# Patient Record
Sex: MALE | Race: WHITE | NOT HISPANIC OR LATINO | Employment: UNEMPLOYED | URBAN - METROPOLITAN AREA
[De-identification: names, ages, dates, MRNs, and addresses within clinical notes are randomized per-mention and may not be internally consistent; named-entity substitution may affect disease eponyms.]

---

## 2019-02-12 ENCOUNTER — OFFICE VISIT CONVERTED (OUTPATIENT)
Dept: UROLOGY | Facility: CLINIC | Age: 39
End: 2019-02-12
Attending: UROLOGY

## 2019-02-14 ENCOUNTER — HOSPITAL ENCOUNTER (OUTPATIENT)
Dept: CARDIOLOGY | Facility: HOSPITAL | Age: 39
Discharge: HOME | End: 2019-02-14

## 2019-02-17 ENCOUNTER — HOSPITAL ENCOUNTER (OUTPATIENT)
Dept: URGENT CARE | Facility: CLINIC | Age: 39
Discharge: HOME | End: 2019-02-17
Attending: FAMILY MEDICINE

## 2019-12-20 ENCOUNTER — OFFICE VISIT CONVERTED (OUTPATIENT)
Dept: ORTHOPEDIC SURGERY | Facility: CLINIC | Age: 39
End: 2019-12-20
Attending: ORTHOPAEDIC SURGERY

## 2019-12-20 ENCOUNTER — CONVERSION ENCOUNTER (OUTPATIENT)
Dept: ORTHOPEDIC SURGERY | Facility: CLINIC | Age: 39
End: 2019-12-20

## 2020-02-20 ENCOUNTER — OFFICE VISIT CONVERTED (OUTPATIENT)
Dept: ORTHOPEDIC SURGERY | Facility: CLINIC | Age: 40
End: 2020-02-20
Attending: PHYSICIAN ASSISTANT

## 2020-02-26 ENCOUNTER — HOSPITAL ENCOUNTER (OUTPATIENT)
Dept: MRI IMAGING | Facility: HOSPITAL | Age: 40
Discharge: HOME | End: 2020-02-26
Attending: PHYSICIAN ASSISTANT

## 2020-03-06 ENCOUNTER — OFFICE VISIT CONVERTED (OUTPATIENT)
Dept: ORTHOPEDIC SURGERY | Facility: CLINIC | Age: 40
End: 2020-03-06
Attending: PHYSICIAN ASSISTANT

## 2021-05-15 VITALS — BODY MASS INDEX: 31.99 KG/M2 | HEIGHT: 71 IN | OXYGEN SATURATION: 98 % | WEIGHT: 228.5 LBS | HEART RATE: 80 BPM

## 2021-05-15 VITALS — HEIGHT: 71 IN | WEIGHT: 237.12 LBS | BODY MASS INDEX: 33.2 KG/M2 | OXYGEN SATURATION: 97 % | HEART RATE: 103 BPM

## 2021-05-15 VITALS — BODY MASS INDEX: 32.94 KG/M2 | OXYGEN SATURATION: 99 % | HEIGHT: 71 IN | HEART RATE: 128 BPM | WEIGHT: 235.25 LBS

## 2021-05-16 VITALS
HEIGHT: 71 IN | BODY MASS INDEX: 27.77 KG/M2 | SYSTOLIC BLOOD PRESSURE: 140 MMHG | WEIGHT: 198.37 LBS | DIASTOLIC BLOOD PRESSURE: 88 MMHG

## 2022-09-03 ENCOUNTER — APPOINTMENT (OUTPATIENT)
Dept: GENERAL RADIOLOGY | Facility: HOSPITAL | Age: 42
End: 2022-09-03

## 2022-09-03 ENCOUNTER — HOSPITAL ENCOUNTER (EMERGENCY)
Facility: HOSPITAL | Age: 42
Discharge: HOME | End: 2022-09-03
Attending: EMERGENCY MEDICINE | Admitting: EMERGENCY MEDICINE

## 2022-09-03 VITALS
HEART RATE: 77 BPM | DIASTOLIC BLOOD PRESSURE: 76 MMHG | TEMPERATURE: 97.9 F | WEIGHT: 216.27 LBS | BODY MASS INDEX: 30.28 KG/M2 | SYSTOLIC BLOOD PRESSURE: 122 MMHG | HEIGHT: 71 IN | RESPIRATION RATE: 20 BRPM | OXYGEN SATURATION: 100 %

## 2022-09-03 DIAGNOSIS — R60.9 PERIPHERAL EDEMA: Primary | ICD-10-CM

## 2022-09-03 LAB
ALBUMIN SERPL-MCNC: 4 G/DL (ref 3.5–5.2)
ALBUMIN/GLOB SERPL: 1.6 G/DL
ALP SERPL-CCNC: 97 U/L (ref 39–117)
ALT SERPL W P-5'-P-CCNC: 16 U/L (ref 1–41)
ANION GAP SERPL CALCULATED.3IONS-SCNC: 8.9 MMOL/L (ref 5–15)
AST SERPL-CCNC: 17 U/L (ref 1–40)
BASOPHILS # BLD AUTO: 0.05 10*3/MM3 (ref 0–0.2)
BASOPHILS NFR BLD AUTO: 0.6 % (ref 0–1.5)
BILIRUB SERPL-MCNC: 0.4 MG/DL (ref 0–1.2)
BUN SERPL-MCNC: 15 MG/DL (ref 6–20)
BUN/CREAT SERPL: 17.4 (ref 7–25)
CALCIUM SPEC-SCNC: 9.1 MG/DL (ref 8.6–10.5)
CHLORIDE SERPL-SCNC: 102 MMOL/L (ref 98–107)
CO2 SERPL-SCNC: 29.1 MMOL/L (ref 22–29)
CREAT SERPL-MCNC: 0.86 MG/DL (ref 0.76–1.27)
DEPRECATED RDW RBC AUTO: 41.4 FL (ref 37–54)
EGFRCR SERPLBLD CKD-EPI 2021: 111.6 ML/MIN/1.73
EOSINOPHIL # BLD AUTO: 0.39 10*3/MM3 (ref 0–0.4)
EOSINOPHIL NFR BLD AUTO: 4.7 % (ref 0.3–6.2)
ERYTHROCYTE [DISTWIDTH] IN BLOOD BY AUTOMATED COUNT: 12.6 % (ref 12.3–15.4)
GLOBULIN UR ELPH-MCNC: 2.5 GM/DL
GLUCOSE SERPL-MCNC: 101 MG/DL (ref 65–99)
HCT VFR BLD AUTO: 37.6 % (ref 37.5–51)
HGB BLD-MCNC: 12.9 G/DL (ref 13–17.7)
HOLD SPECIMEN: NORMAL
HOLD SPECIMEN: NORMAL
IMM GRANULOCYTES # BLD AUTO: 0.04 10*3/MM3 (ref 0–0.05)
IMM GRANULOCYTES NFR BLD AUTO: 0.5 % (ref 0–0.5)
LYMPHOCYTES # BLD AUTO: 2.75 10*3/MM3 (ref 0.7–3.1)
LYMPHOCYTES NFR BLD AUTO: 32.9 % (ref 19.6–45.3)
MCH RBC QN AUTO: 30.7 PG (ref 26.6–33)
MCHC RBC AUTO-ENTMCNC: 34.3 G/DL (ref 31.5–35.7)
MCV RBC AUTO: 89.5 FL (ref 79–97)
MONOCYTES # BLD AUTO: 0.71 10*3/MM3 (ref 0.1–0.9)
MONOCYTES NFR BLD AUTO: 8.5 % (ref 5–12)
NEUTROPHILS NFR BLD AUTO: 4.42 10*3/MM3 (ref 1.7–7)
NEUTROPHILS NFR BLD AUTO: 52.8 % (ref 42.7–76)
NRBC BLD AUTO-RTO: 0 /100 WBC (ref 0–0.2)
NT-PROBNP SERPL-MCNC: 51.6 PG/ML (ref 0–450)
PLATELET # BLD AUTO: 270 10*3/MM3 (ref 140–450)
PMV BLD AUTO: 9.9 FL (ref 6–12)
POTASSIUM SERPL-SCNC: 4 MMOL/L (ref 3.5–5.2)
PROT SERPL-MCNC: 6.5 G/DL (ref 6–8.5)
RBC # BLD AUTO: 4.2 10*6/MM3 (ref 4.14–5.8)
SODIUM SERPL-SCNC: 140 MMOL/L (ref 136–145)
TROPONIN T SERPL-MCNC: <0.01 NG/ML (ref 0–0.03)
WBC NRBC COR # BLD: 8.36 10*3/MM3 (ref 3.4–10.8)
WHOLE BLOOD HOLD COAG: NORMAL
WHOLE BLOOD HOLD SPECIMEN: NORMAL

## 2022-09-03 PROCEDURE — 36415 COLL VENOUS BLD VENIPUNCTURE: CPT

## 2022-09-03 PROCEDURE — 93005 ELECTROCARDIOGRAM TRACING: CPT | Performed by: EMERGENCY MEDICINE

## 2022-09-03 PROCEDURE — 93005 ELECTROCARDIOGRAM TRACING: CPT

## 2022-09-03 PROCEDURE — 71045 X-RAY EXAM CHEST 1 VIEW: CPT

## 2022-09-03 PROCEDURE — 80053 COMPREHEN METABOLIC PANEL: CPT | Performed by: EMERGENCY MEDICINE

## 2022-09-03 PROCEDURE — 84484 ASSAY OF TROPONIN QUANT: CPT

## 2022-09-03 PROCEDURE — 99284 EMERGENCY DEPT VISIT MOD MDM: CPT

## 2022-09-03 PROCEDURE — 93010 ELECTROCARDIOGRAM REPORT: CPT | Performed by: INTERNAL MEDICINE

## 2022-09-03 PROCEDURE — 83880 ASSAY OF NATRIURETIC PEPTIDE: CPT

## 2022-09-03 PROCEDURE — 85025 COMPLETE CBC W/AUTO DIFF WBC: CPT

## 2022-09-03 RX ORDER — HYDROCHLOROTHIAZIDE 25 MG/1
25 TABLET ORAL DAILY
Qty: 30 TABLET | Refills: 0 | Status: SHIPPED | OUTPATIENT
Start: 2022-09-03

## 2022-09-03 RX ORDER — HYDROCHLOROTHIAZIDE 12.5 MG/1
12.5 CAPSULE, GELATIN COATED ORAL DAILY
Status: DISCONTINUED | OUTPATIENT
Start: 2022-09-03 | End: 2022-09-03 | Stop reason: HOSPADM

## 2022-09-03 RX ORDER — SODIUM CHLORIDE 0.9 % (FLUSH) 0.9 %
10 SYRINGE (ML) INJECTION AS NEEDED
Status: DISCONTINUED | OUTPATIENT
Start: 2022-09-03 | End: 2022-09-03 | Stop reason: HOSPADM

## 2022-09-03 RX ADMIN — HYDROCHLOROTHIAZIDE 12.5 MG: 12.5 CAPSULE ORAL at 16:01

## 2022-09-03 NOTE — ED PROVIDER NOTES
Subjective   Patient presents to the emergency department today reporting shortness of breath, dizziness, lightheadedness, bilateral leg swelling causing pain with walking, and a cough that started 2 and half weeks ago.  He says he started coughing up green stuff 3 days ago.  He also reports sharp low back pain and says he has a history of kidney stones.  He says all of this happened a couple of years ago to.  He is currently staying at [facility].          Review of Systems   Constitutional: Negative for chills and fever.   HENT: Negative for congestion, ear pain, rhinorrhea and sore throat.    Eyes: Negative for pain.   Respiratory: Positive for cough and shortness of breath.    Cardiovascular: Positive for leg swelling. Negative for chest pain.   Gastrointestinal: Negative for abdominal pain, diarrhea, nausea and vomiting.   Genitourinary: Negative for decreased urine volume, dysuria and flank pain.   Musculoskeletal: Positive for back pain (Sharp low back pain) and gait problem (Pain with walking). Negative for arthralgias and myalgias.   Skin: Negative for rash.   Neurological: Positive for dizziness and light-headedness. Negative for seizures and headaches.   All other systems reviewed and are negative.      Past Medical History:   Diagnosis Date   • Depression    • Hypertension        Allergies   Allergen Reactions   • Penicillins Rash and Swelling   • Sulfa Antibiotics Swelling   • Sulfamethoxazole-Trimethoprim Swelling   • Cefdinir Hives     Unknown reaction      • Cephalexin Rash     Unknown reaction      • Sulfacetamide-Sulfur In Urea Rash       Past Surgical History:   Procedure Laterality Date   • BACK SURGERY         History reviewed. No pertinent family history.    Social History     Socioeconomic History   • Marital status:    Tobacco Use   • Smoking status: Current Every Day Smoker     Packs/day: 1.00     Years: 20.00     Pack years: 20.00     Types: Cigarettes   • Smokeless  tobacco: Never Used   Vaping Use   • Vaping Use: Never used   Substance and Sexual Activity   • Alcohol use: Never   • Drug use: Never   • Sexual activity: Defer           Objective   Physical Exam  Vitals and nursing note reviewed.   Constitutional:       General: He is not in acute distress.     Appearance: Normal appearance. He is well-developed and normal weight. He is not ill-appearing, toxic-appearing or diaphoretic.   HENT:      Head: Normocephalic and atraumatic.      Right Ear: External ear normal.      Left Ear: External ear normal.   Eyes:      General: No scleral icterus.     Conjunctiva/sclera: Conjunctivae normal.      Pupils: Pupils are equal, round, and reactive to light.   Cardiovascular:      Rate and Rhythm: Normal rate and regular rhythm.      Heart sounds: Normal heart sounds.   Pulmonary:      Effort: Pulmonary effort is normal. No respiratory distress.      Breath sounds: Normal breath sounds.   Abdominal:      General: Abdomen is flat. Bowel sounds are normal. There is no distension.      Palpations: Abdomen is soft.      Tenderness: There is no abdominal tenderness.   Musculoskeletal:         General: No swelling, deformity or signs of injury. Normal range of motion.      Cervical back: Normal range of motion and neck supple.      Right lower leg: No edema.      Left lower leg: No edema.   Skin:     General: Skin is warm and dry.      Capillary Refill: Capillary refill takes less than 2 seconds.   Neurological:      General: No focal deficit present.      Mental Status: He is alert and oriented to person, place, and time.   Psychiatric:         Mood and Affect: Mood normal.         Behavior: Behavior normal.         Procedures           ED Course                                           MDM  Number of Diagnoses or Management Options  Peripheral edema: new and requires workup     Amount and/or Complexity of Data Reviewed  Clinical lab tests: reviewed  Tests in the radiology section of CPT®:  reviewed  Tests in the medicine section of CPT®: reviewed    Risk of Complications, Morbidity, and/or Mortality  Presenting problems: moderate  Diagnostic procedures: moderate  Management options: moderate    Patient Progress  Patient progress: stable      Final diagnoses:   Peripheral edema       ED Disposition  ED Disposition     ED Disposition   Discharge    Condition   Stable    Comment   --             Lobo Potts MD  [address]  [address]  [place] [id number] [phone]    Schedule an appointment as soon as possible for a visit            Medication List      New Prescriptions    hydroCHLOROthiazide 25 MG tablet  Commonly known as: HYDRODIURIL  Take 1 tablet by mouth Daily.           Where to Get Your Medications      These medications were sent to [id number] - [place], [place] - [address] AT [facility] [phone]  - [phone] FX  [address] [place] [address]    Phone: [phone]   · hydroCHLOROthiazide 25 MG tablet          Virginia Kay APRN  09/09/22 0901

## 2022-09-04 LAB — QT INTERVAL: 378 MS

## 2023-02-16 ENCOUNTER — TELEPHONE (OUTPATIENT)
Dept: FAMILY MEDICINE CLINIC | Facility: CLINIC | Age: 43
End: 2023-02-16
Payer: COMMERCIAL

## 2023-02-16 NOTE — TELEPHONE ENCOUNTER
LVMTCB     Patient cancelled their appointment scheduled for 2/22/23 with Luc Velásquez. Would patient like to reschedule?       HUB TO READ AND SHARE

## 2023-05-19 PROBLEM — N20.0 KIDNEY CALCULUS: Status: ACTIVE | Noted: 2023-05-19

## 2023-05-19 PROBLEM — F41.1 GENERALIZED ANXIETY DISORDER: Status: ACTIVE | Noted: 2023-05-19

## 2023-05-19 PROBLEM — M19.90 ARTHRITIS: Status: ACTIVE | Noted: 2023-05-19

## 2023-05-19 PROBLEM — E66.3 OVERWEIGHT WITH BODY MASS INDEX (BMI) 25.0-29.9: Status: ACTIVE | Noted: 2023-05-19

## 2023-05-19 PROBLEM — F11.20 OPIOID DEPENDENCE: Status: ACTIVE | Noted: 2023-05-19

## 2023-05-19 PROBLEM — M54.50 LOW BACK PAIN: Status: ACTIVE | Noted: 2023-05-19

## 2023-05-19 PROBLEM — J30.9 ALLERGIC RHINITIS: Status: ACTIVE | Noted: 2023-05-19

## 2023-05-19 PROBLEM — G47.00 INSOMNIA: Status: ACTIVE | Noted: 2023-05-19

## 2023-05-19 PROBLEM — G43.909 MIGRAINE: Status: ACTIVE | Noted: 2023-05-19

## 2023-05-19 PROBLEM — I20.9 OTHER AND UNSPECIFIED ANGINA PECTORIS: Status: ACTIVE | Noted: 2023-05-19

## 2023-05-19 PROBLEM — I10 ESSENTIAL HYPERTENSION: Status: ACTIVE | Noted: 2019-03-15

## 2023-05-19 PROBLEM — J45.909 ASTHMA: Status: ACTIVE | Noted: 2023-05-19

## 2023-06-12 ENCOUNTER — HOSPITAL ENCOUNTER (OUTPATIENT)
Dept: GENERAL RADIOLOGY | Facility: HOSPITAL | Age: 43
Discharge: HOME | End: 2023-06-12
Admitting: INTERNAL MEDICINE
Payer: COMMERCIAL

## 2023-06-12 ENCOUNTER — TRANSCRIBE ORDERS (OUTPATIENT)
Dept: ADMINISTRATIVE | Facility: HOSPITAL | Age: 43
End: 2023-06-12
Payer: COMMERCIAL

## 2023-06-12 DIAGNOSIS — M25.512 LEFT SHOULDER PAIN, UNSPECIFIED CHRONICITY: Primary | ICD-10-CM

## 2023-06-12 DIAGNOSIS — M25.512 LEFT SHOULDER PAIN, UNSPECIFIED CHRONICITY: ICD-10-CM

## 2023-06-12 PROCEDURE — 73030 X-RAY EXAM OF SHOULDER: CPT

## 2023-06-14 ENCOUNTER — HOSPITAL ENCOUNTER (EMERGENCY)
Facility: HOSPITAL | Age: 43
Discharge: HOME | End: 2023-06-14
Attending: EMERGENCY MEDICINE
Payer: COMMERCIAL

## 2023-06-14 VITALS
SYSTOLIC BLOOD PRESSURE: 111 MMHG | HEIGHT: 71 IN | BODY MASS INDEX: 29.88 KG/M2 | HEART RATE: 81 BPM | TEMPERATURE: 99.3 F | WEIGHT: 213.41 LBS | DIASTOLIC BLOOD PRESSURE: 64 MMHG | OXYGEN SATURATION: 96 % | RESPIRATION RATE: 20 BRPM

## 2023-06-14 DIAGNOSIS — R05.9 COUGH IN ADULT: ICD-10-CM

## 2023-06-14 DIAGNOSIS — R50.9 FEVER IN ADULT: ICD-10-CM

## 2023-06-14 DIAGNOSIS — B34.9 ACUTE VIRAL SYNDROME: Primary | ICD-10-CM

## 2023-06-14 LAB
ALBUMIN SERPL-MCNC: 4.2 G/DL (ref 3.5–5.2)
ALBUMIN/GLOB SERPL: 1.4 G/DL
ALP SERPL-CCNC: 64 U/L (ref 39–117)
ALT SERPL W P-5'-P-CCNC: 17 U/L (ref 1–41)
ANION GAP SERPL CALCULATED.3IONS-SCNC: 9.1 MMOL/L (ref 5–15)
AST SERPL-CCNC: 22 U/L (ref 1–40)
BASOPHILS # BLD AUTO: 0.05 10*3/MM3 (ref 0–0.2)
BASOPHILS NFR BLD AUTO: 0.3 % (ref 0–1.5)
BILIRUB SERPL-MCNC: 0.4 MG/DL (ref 0–1.2)
BILIRUB UR QL STRIP: NEGATIVE
BUN SERPL-MCNC: 15 MG/DL (ref 6–20)
BUN/CREAT SERPL: 10.5 (ref 7–25)
CALCIUM SPEC-SCNC: 9.1 MG/DL (ref 8.6–10.5)
CHLORIDE SERPL-SCNC: 98 MMOL/L (ref 98–107)
CLARITY UR: CLEAR
CO2 SERPL-SCNC: 25.9 MMOL/L (ref 22–29)
COLOR UR: YELLOW
CREAT SERPL-MCNC: 1.43 MG/DL (ref 0.76–1.27)
D-LACTATE SERPL-SCNC: 1.5 MMOL/L (ref 0.5–2)
DEPRECATED RDW RBC AUTO: 47.7 FL (ref 37–54)
EGFRCR SERPLBLD CKD-EPI 2021: 62.7 ML/MIN/1.73
EOSINOPHIL # BLD AUTO: 0.14 10*3/MM3 (ref 0–0.4)
EOSINOPHIL NFR BLD AUTO: 0.8 % (ref 0.3–6.2)
ERYTHROCYTE [DISTWIDTH] IN BLOOD BY AUTOMATED COUNT: 15.4 % (ref 12.3–15.4)
GLOBULIN UR ELPH-MCNC: 2.9 GM/DL
GLUCOSE SERPL-MCNC: 109 MG/DL (ref 65–99)
GLUCOSE UR STRIP-MCNC: NEGATIVE MG/DL
HCT VFR BLD AUTO: 40.2 % (ref 37.5–51)
HGB BLD-MCNC: 13.6 G/DL (ref 13–17.7)
HGB UR QL STRIP.AUTO: NEGATIVE
IMM GRANULOCYTES # BLD AUTO: 0.08 10*3/MM3 (ref 0–0.05)
IMM GRANULOCYTES NFR BLD AUTO: 0.5 % (ref 0–0.5)
KETONES UR QL STRIP: NEGATIVE
LEUKOCYTE ESTERASE UR QL STRIP.AUTO: NEGATIVE
LIPASE SERPL-CCNC: 56 U/L (ref 13–60)
LYMPHOCYTES # BLD AUTO: 1.66 10*3/MM3 (ref 0.7–3.1)
LYMPHOCYTES NFR BLD AUTO: 9.6 % (ref 19.6–45.3)
MCH RBC QN AUTO: 28.6 PG (ref 26.6–33)
MCHC RBC AUTO-ENTMCNC: 33.8 G/DL (ref 31.5–35.7)
MCV RBC AUTO: 84.6 FL (ref 79–97)
MONOCYTES # BLD AUTO: 1.44 10*3/MM3 (ref 0.1–0.9)
MONOCYTES NFR BLD AUTO: 8.4 % (ref 5–12)
NEUTROPHILS NFR BLD AUTO: 13.84 10*3/MM3 (ref 1.7–7)
NEUTROPHILS NFR BLD AUTO: 80.4 % (ref 42.7–76)
NITRITE UR QL STRIP: NEGATIVE
NRBC BLD AUTO-RTO: 0 /100 WBC (ref 0–0.2)
PH UR STRIP.AUTO: 7.5 [PH] (ref 5–8)
PLATELET # BLD AUTO: 281 10*3/MM3 (ref 140–450)
PMV BLD AUTO: 9.3 FL (ref 6–12)
POTASSIUM SERPL-SCNC: 4.4 MMOL/L (ref 3.5–5.2)
PROT SERPL-MCNC: 7.1 G/DL (ref 6–8.5)
PROT UR QL STRIP: ABNORMAL
RBC # BLD AUTO: 4.75 10*6/MM3 (ref 4.14–5.8)
SODIUM SERPL-SCNC: 133 MMOL/L (ref 136–145)
SP GR UR STRIP: 1.02 (ref 1–1.03)
UROBILINOGEN UR QL STRIP: ABNORMAL
WBC NRBC COR # BLD: 17.21 10*3/MM3 (ref 3.4–10.8)

## 2023-06-14 PROCEDURE — 80053 COMPREHEN METABOLIC PANEL: CPT

## 2023-06-14 PROCEDURE — 83690 ASSAY OF LIPASE: CPT

## 2023-06-14 PROCEDURE — 87040 BLOOD CULTURE FOR BACTERIA: CPT | Performed by: PHYSICIAN ASSISTANT

## 2023-06-14 PROCEDURE — 94799 UNLISTED PULMONARY SVC/PX: CPT

## 2023-06-14 PROCEDURE — 63710000001 ONDANSETRON ODT 4 MG TABLET DISPERSIBLE

## 2023-06-14 PROCEDURE — 94640 AIRWAY INHALATION TREATMENT: CPT

## 2023-06-14 PROCEDURE — 87081 CULTURE SCREEN ONLY: CPT | Performed by: NURSE PRACTITIONER

## 2023-06-14 PROCEDURE — 85025 COMPLETE CBC W/AUTO DIFF WBC: CPT

## 2023-06-14 PROCEDURE — 81003 URINALYSIS AUTO W/O SCOPE: CPT | Performed by: EMERGENCY MEDICINE

## 2023-06-14 PROCEDURE — 83605 ASSAY OF LACTIC ACID: CPT

## 2023-06-14 PROCEDURE — 99283 EMERGENCY DEPT VISIT LOW MDM: CPT

## 2023-06-14 PROCEDURE — 36415 COLL VENOUS BLD VENIPUNCTURE: CPT

## 2023-06-14 RX ORDER — ACETAMINOPHEN 500 MG
1000 TABLET ORAL ONCE
Status: COMPLETED | OUTPATIENT
Start: 2023-06-14 | End: 2023-06-14

## 2023-06-14 RX ORDER — BROMPHENIRAMINE MALEATE, PSEUDOEPHEDRINE HYDROCHLORIDE, AND DEXTROMETHORPHAN HYDROBROMIDE 2; 30; 10 MG/5ML; MG/5ML; MG/5ML
5 SYRUP ORAL 4 TIMES DAILY PRN
Qty: 118 ML | Refills: 0 | Status: SHIPPED | OUTPATIENT
Start: 2023-06-14

## 2023-06-14 RX ORDER — ALBUTEROL SULFATE 0.63 MG/3ML
1 SOLUTION RESPIRATORY (INHALATION) EVERY 6 HOURS PRN
Qty: 3 ML | Refills: 0 | Status: SHIPPED | OUTPATIENT
Start: 2023-06-14

## 2023-06-14 RX ORDER — ONDANSETRON 4 MG/1
4 TABLET, ORALLY DISINTEGRATING ORAL ONCE
Status: COMPLETED | OUTPATIENT
Start: 2023-06-14 | End: 2023-06-14

## 2023-06-14 RX ORDER — METHYLPREDNISOLONE 4 MG/1
TABLET ORAL
Qty: 21 TABLET | Refills: 0 | Status: SHIPPED | OUTPATIENT
Start: 2023-06-14 | End: 2023-06-20

## 2023-06-14 RX ORDER — IPRATROPIUM BROMIDE AND ALBUTEROL SULFATE 2.5; .5 MG/3ML; MG/3ML
SOLUTION RESPIRATORY (INHALATION)
Status: COMPLETED
Start: 2023-06-14 | End: 2023-06-14

## 2023-06-14 RX ORDER — IPRATROPIUM BROMIDE AND ALBUTEROL SULFATE 2.5; .5 MG/3ML; MG/3ML
3 SOLUTION RESPIRATORY (INHALATION) ONCE
Status: COMPLETED | OUTPATIENT
Start: 2023-06-14 | End: 2023-06-14

## 2023-06-14 RX ADMIN — ONDANSETRON 4 MG: 4 TABLET, ORALLY DISINTEGRATING ORAL at 17:47

## 2023-06-14 RX ADMIN — IPRATROPIUM BROMIDE AND ALBUTEROL SULFATE 3 ML: .5; 3 SOLUTION RESPIRATORY (INHALATION) at 17:48

## 2023-06-14 RX ADMIN — IPRATROPIUM BROMIDE AND ALBUTEROL SULFATE 3 ML: 2.5; .5 SOLUTION RESPIRATORY (INHALATION) at 17:48

## 2023-06-14 RX ADMIN — ACETAMINOPHEN 1000 MG: 500 TABLET ORAL at 17:47

## 2023-06-14 NOTE — DISCHARGE INSTRUCTIONS
Drink plenty of fluids, rest, take OTC tylenol/ibuprofen as needed for headache/body aches/fever. Take your prescribed medications as directed. Return to the ED for chest pain, shortness of breath or any other symptoms of concern.     Please use your breathing treatment every 4-6 hours as needed  You should receive a phone call from [facility] in 2 to 3 days with blood culture results if antibiotics are needed or if you need to return to the ED

## 2023-06-14 NOTE — ED PROVIDER NOTES
Time: 3:18 PM EDT  Date of encounter:  6/14/2023  Independent Historian/Clinical History and Information was obtained by:   Patient  Chief Complaint   Patient presents with    Fever    Cough       History is limited by: N/A    History of Present Illness:  Patient is a 42 y.o. year old male who presents to the emergency department for evaluation of fever, cough, nausea, vomiting, weakness.  Symptoms started yesterday.  Went to urgent care and had a negative COVID, flu, EKG and chest x-ray.  Advised to come to the ED for further evaluation.  Cough is productive.  States had a dental abscess 3 weeks ago and finished antibiotics.  Denies any current dental pain.  Denies any abdominal pain, diarrhea, dysuria. Last had ibuprofen 800 mg 3-4 hours ago. (Jenaro Wilks PA-C provider in triage 3:19 PM EDT )     HPI  She is a 42-year-old male presenting today due to fever, cough, nausea and fatigue since yesterday.  Patient went to urgent care today and had COVID, flu, chest x-ray and strep and EKG, all negative.  Patient states his cough is productive of dark green sputum.  States he has been taking Tylenol Motrin and Tylenol cold and flu over-the-counter.  He denies vomiting, diarrhea, dysuria, hematuria, recent travel or exposure to illness.      Patient Care Team  Primary Care Provider: Chris Potts MD    Past Medical History:     Allergies   Allergen Reactions    Penicillins Rash and Swelling    Sulfa Antibiotics Swelling    Sulfamethoxazole-Trimethoprim Swelling and Unknown - High Severity    Amoxicillin Unknown - High Severity    Cefdinir Hives     Unknown reaction       Sulfadiazine Unknown - High Severity    Bee Venom Rash    Cephalexin Rash and Unknown - High Severity     Unknown reaction    Sulfacetamide-Sulfur In Urea Rash     Past Medical History:   Diagnosis Date    Arthritis     Depression     Hyperlipidemia     Hypertension     Opioid abuse     Substance abuse     Tobacco abuse      Past Surgical  History:   Procedure Laterality Date    BACK SURGERY       History reviewed. No pertinent family history.    Home Medications:  Prior to Admission medications    Medication Sig Start Date End Date Taking? Authorizing Provider   buprenorphine-naloxone (SUBOXONE) 8-2 MG per SL tablet Place 1 tablet under the tongue Daily.    Emergency, Nurse GRACIA Oh   cloNIDine (CATAPRES) 0.3 MG tablet TAKE ONE TABLET BY MOUTH UP TO THREE TIMES DAILY AS NEEDED FOR ANXIETY 1/31/23   Glen Wei MD   EPINEPHrine (EPIPEN) 0.3 MG/0.3ML solution auto-injector injection See Admin Instructions.    Emergency, Nurse GRACIA Oh   fenofibrate (TRICOR) 145 MG tablet Take 1 tablet by mouth Daily. 11/8/22   Glen Wei MD   furosemide (LASIX) 20 MG tablet Take 1 tablet by mouth Daily. 11/8/22   Glen Wei MD   gabapentin (NEURONTIN) 400 MG capsule Take 1 capsule by mouth 3 (Three) Times a Day. 1/30/23   Glen Wei MD   hydroCHLOROthiazide (HYDRODIURIL) 25 MG tablet Take 1 tablet by mouth Daily. 9/3/22   Virginia Kay APRN   hydrOXYzine pamoate (VISTARIL) 100 MG capsule TAKE 1 CAPSULE THREE TIMES DAILY AS NEEDED anxiety/insomnia 1/4/23   Glen Wei MD   metoprolol succinate XL (TOPROL-XL) 50 MG 24 hr tablet Take 1 tablet by mouth Daily. 11/9/22   Glen Wei MD   omeprazole (priLOSEC) 20 MG capsule Prilosec 20 mg oral capsule,delayed release(DR/EC) take 1 capsule (20 mg) by oral route once daily before a meal   Suspended    Emergency, Nurse GRACIA Oh   predniSONE (DELTASONE) 10 MG (21) dose pack Use as directed on package 6/2/23   Neyda Staples APRN   QUEtiapine Fumarate 150 MG tablet Take 1 tablet by mouth every night at bedtime. 1/30/23   Glen Wei MD   triamcinolone (KENALOG) 0.1 % cream Apply 1 application topically to the appropriate area as directed 2 (Two) Times a Day. 6/2/23   Neyda Staples APRN        Social History:   Social History     Tobacco Use  "   Smoking status: Every Day     Packs/day: 1.00     Years: 20.00     Pack years: 20.00     Types: Cigarettes     Passive exposure: Never    Smokeless tobacco: Never   Vaping Use    Vaping Use: Never used   Substance Use Topics    Alcohol use: Never    Drug use: Not Currently     Comment: h/o heroine opiod dependency         Review of Systems:  Review of Systems   Constitutional:  Positive for chills and fever.   HENT: Negative.     Eyes: Negative.    Respiratory:  Positive for cough. Negative for shortness of breath.    Cardiovascular: Negative.    Gastrointestinal:  Positive for nausea and vomiting.   Endocrine: Negative.    Genitourinary: Negative.    Musculoskeletal: Negative.  Negative for myalgias.   Skin: Negative.    Allergic/Immunologic: Negative.    Neurological:  Positive for weakness.   Hematological: Negative.    Psychiatric/Behavioral: Negative.        Physical Exam:  /66 (BP Location: Right arm, Patient Position: Sitting)   Pulse 88   Temp (!) 100.6 °F (38.1 °C) (Oral)   Resp 20   Ht 180.3 cm (71\")   Wt 96.8 kg (213 lb 6.5 oz)   SpO2 99%   BMI 29.76 kg/m²     Physical Exam  Vitals and nursing note reviewed.   Constitutional:       Appearance: He is normal weight. He is ill-appearing. He is not toxic-appearing.   HENT:      Head: Normocephalic and atraumatic.      Nose: Nose normal.      Mouth/Throat:      Mouth: Mucous membranes are moist.   Eyes:      Extraocular Movements: Extraocular movements intact.      Conjunctiva/sclera: Conjunctivae normal.      Pupils: Pupils are equal, round, and reactive to light.   Cardiovascular:      Rate and Rhythm: Normal rate and regular rhythm.      Heart sounds: Normal heart sounds.   Pulmonary:      Effort: Pulmonary effort is normal.      Breath sounds: Rales present.   Musculoskeletal:         General: Normal range of motion.      Cervical back: Normal range of motion and neck supple.   Skin:     General: Skin is warm and dry.      Coloration: Skin " is not cyanotic.   Neurological:      General: No focal deficit present.      Mental Status: He is alert and oriented to person, place, and time.   Psychiatric:         Attention and Perception: Attention and perception normal.         Mood and Affect: Mood normal.         Behavior: Behavior normal.                Procedures:  Procedures      Medical Decision Making:      Comorbidities that affect care:    Hypertension    External Notes reviewed:    Previous Clinic Note: Care visit where patient was swabbed for strep, flu and COVID, all negative, EKG was normalChest x-ray of was normal as well.      The following orders were placed and all results were independently analyzed by me:  Orders Placed This Encounter   Procedures    Home Nebulizer    Blood Culture - Blood,    Blood Culture - Blood,    Comprehensive Metabolic Panel    Lipase    Lactic Acid, Plasma    Urinalysis With Microscopic If Indicated (No Culture) - Urine, Clean Catch    CBC Auto Differential    Vital Signs    CBC & Differential       Medications Given in the Emergency Department:  Medications   ipratropium-albuterol (DUO-NEB) nebulizer solution 3 mL (3 mL Nebulization Given 6/14/23 1748)   acetaminophen (TYLENOL) tablet 1,000 mg (1,000 mg Oral Given 6/14/23 1747)   ondansetron ODT (ZOFRAN-ODT) disintegrating tablet 4 mg (4 mg Oral Given 6/14/23 1747)        ED Course:    The patient was initially evaluated in the triage area where orders were placed. The patient was later dispositioned by Lien Cohn PA-C.      The patient was advised to stay for completion of workup which includes but is not limited to communication of labs and radiological results, reassessment and plan. The patient was advised that leaving prior to disposition by a provider could result in critical findings that are not communicated to the patient.          Labs:    Lab Results (last 24 hours)       Procedure Component Value Units Date/Time    POC Rapid Strep A  [[id number]]  (Normal) Resulted: 06/14/23 1310    Specimen: Swab Updated: 06/14/23 1310     Rapid Strep A Screen Negative     Internal Control Passed     Lot Number [id number]     Expiration Date 10/29/2024    POCT SARS-CoV-2 Antigen WALKER   ([facility]) [[id number]] Collected: 06/14/23 1321    Specimen: Swab Updated: 06/14/23 1322     SARS Antigen Presumptive Negative     Internal Control Passed     Lot Number [id number]     Expiration Date 10/02/2023    POC Influenza A/B [[id number]] Collected: 06/14/23 1321    Specimen: Swab Updated: 06/14/23 1322     Rapid Influenza A Ag Negative     Rapid Influenza B Ag Negative     Internal Control Passed     Lot Number [id number]     Expiration Date 11/30/2024    Beta Strep Culture, Throat - Swab, Throat [[id number]] Collected: 06/14/23 1358    Specimen: Swab from Throat Updated: 06/14/23 1358    CBC & Differential [[id number]]  (Abnormal) Collected: 06/14/23 1542    Specimen: Blood Updated: 06/14/23 1608    Narrative:      The following orders were created for panel order CBC & Differential.  Procedure                               Abnormality         Status                     ---------                               -----------         ------                     CBC Auto Differential[[id number]]        Abnormal            Final result                 Please view results for these tests on the individual orders.    Comprehensive Metabolic Panel [[id number]]  (Abnormal) Collected: 06/14/23 1542    Specimen: Blood Updated: 06/14/23 1620     Glucose 109 mg/dL      BUN 15 mg/dL      Creatinine 1.43 mg/dL      Sodium 133 mmol/L      Potassium 4.4 mmol/L      Chloride 98 mmol/L      CO2 25.9 mmol/L      Calcium 9.1 mg/dL      Total Protein 7.1 g/dL      Albumin 4.2 g/dL      ALT (SGPT) 17 U/L      AST (SGOT) 22 U/L      Alkaline Phosphatase 64 U/L      Total Bilirubin 0.4 mg/dL      Globulin 2.9 gm/dL      A/G Ratio 1.4 g/dL      BUN/Creatinine Ratio 10.5     Anion Gap 9.1 mmol/L       eGFR 62.7 mL/min/1.73     Narrative:      GFR Normal >60  Chronic Kidney Disease <60  Kidney Failure <15      Lipase [[id number]]  (Normal) Collected: 06/14/23 1542    Specimen: Blood Updated: 06/14/23 1620     Lipase 56 U/L     Lactic Acid, Plasma [[id number]]  (Normal) Collected: 06/14/23 1542    Specimen: Blood Updated: 06/14/23 1615     Lactate 1.5 mmol/L     CBC Auto Differential [[id number]]  (Abnormal) Collected: 06/14/23 1542    Specimen: Blood Updated: 06/14/23 1608     WBC 17.21 10*3/mm3      RBC 4.75 10*6/mm3      Hemoglobin 13.6 g/dL      Hematocrit 40.2 %      MCV 84.6 fL      MCH 28.6 pg      MCHC 33.8 g/dL      RDW 15.4 %      RDW-SD 47.7 fl      MPV 9.3 fL      Platelets 281 10*3/mm3      Neutrophil % 80.4 %      Lymphocyte % 9.6 %      Monocyte % 8.4 %      Eosinophil % 0.8 %      Basophil % 0.3 %      Immature Grans % 0.5 %      Neutrophils, Absolute 13.84 10*3/mm3      Lymphocytes, Absolute 1.66 10*3/mm3      Monocytes, Absolute 1.44 10*3/mm3      Eosinophils, Absolute 0.14 10*3/mm3      Basophils, Absolute 0.05 10*3/mm3      Immature Grans, Absolute 0.08 10*3/mm3      nRBC 0.0 /100 WBC     Blood Culture - Blood, Arm, Right [[id number]] Collected: 06/14/23 1749    Specimen: Blood from Arm, Right Updated: 06/14/23 1753    Urinalysis With Microscopic If Indicated (No Culture) - Urine, Clean Catch [[id number]]  (Abnormal) Collected: 06/14/23 1802    Specimen: Urine, Clean Catch Updated: 06/14/23 1817     Color, UA Yellow     Appearance, UA Clear     pH, UA 7.5     Specific Gravity, UA 1.019     Glucose, UA Negative     Ketones, UA Negative     Bilirubin, UA Negative     Blood, UA Negative     Protein, UA Trace     Leuk Esterase, UA Negative     Nitrite, UA Negative     Urobilinogen, UA 1.0 E.U./dL    Narrative:      Urine microscopic not indicated.    Blood Culture - Blood, Arm, Left [[id number]] Collected: 06/14/23 1802    Specimen: Blood from Arm, Left Updated: 06/14/23 1809              Imaging:    XR Chest 2 View    Result Date: 6/14/2023  PROCEDURE: XR CHEST 2 VW  COMPARISON: [facility], , CHEST AP/PA 1 VIEW, 1/17/2018, 11:56.  INDICATIONS: pain with cough  FINDINGS:  The lungs are clear bilaterally.  The cardiac and mediastinal silhouettes appear normal.  No effusion is evident.  No fracture or pneumothorax is identified.        1. No acute cardiopulmonary disease.     Oscar Kolb M.D.       Electronically Signed and Approved By: Oscar Kolb M.D. on 6/14/2023 at 13:36                Differential Diagnosis and Discussion:      Cough: Differential diagnosis includes but is not limited to pneumonia, acute bronchitis, upper respiratory infection, ACE inhibitor use, allergic reaction, epiglottitis, seasonal allergies, chemical irritants, exercise-induced asthma, viral syndrome.  Fever: Based on the complaint of fever, differential diagnosis includes but is not limited to meningitis, pneumonia, pyelonephritis, acute uti,  systemic immune response syndrome, sepsis, viral syndrome, fungal infection, tick born illness and other bacterial infections.    All labs were reviewed and interpreted by me.    MDM     Amount and/or Complexity of Data Reviewed  Clinical lab tests: reviewed  Decide to obtain previous medical records or to obtain history from someone other than the patient: yes       Patient Care Considerations:    CHEST X-RAY: I considered ordering a chest x-ray however x-ray is normal.  CT CHEST: I considered ordering a CT scan of the chest, however respirations normal, patient nontacky oxygen above 99%      Consultants/Shared Management Plan:    None    Social Determinants of Health:    Patient is independent, reliable, and has access to care.       Disposition and Care Coordination:    Discharged: The patient is suitable and stable for discharge with no need for consideration of observation or admission.    I have explained the patient´s condition, diagnoses and treatment  plan based on the information available to me at this time. I have answered questions and addressed any concerns. The patient has a good  understanding of the patient´s diagnosis, condition, and treatment plan as can be expected at this point. The vital signs have been stable. The patient´s condition is stable and appropriate for discharge from the emergency department.      The patient will pursue further outpatient evaluation with the primary care physician or other designated or consulting physician as outlined in the discharge instructions. They are agreeable to this plan of care and follow-up instructions have been explained in detail. The patient has received these instructions in written format and have expressed an understanding of the discharge instructions. The patient is aware that any significant change in condition or worsening of symptoms should prompt an immediate return to this or the closest emergency department or call to 911.  I have explained discharge medications and the need for follow up with the patient/caretakers. This was also printed in the discharge instructions. Patient was discharged with the following medications and follow up:      Medication List        New Prescriptions      albuterol 0.63 MG/3ML nebulizer solution  Commonly known as: ACCUNEB  Take 3 mL by nebulization Every 6 (Six) Hours As Needed for Wheezing.     brompheniramine-pseudoephedrine-DM 30-2-10 MG/5ML syrup  Take 5 mL by mouth 4 (Four) Times a Day As Needed for Cough.     methylPREDNISolone 4 MG dose pack  Commonly known as: MEDROL  Take 6 tablets by mouth Daily for 1 day, THEN 5 tablets Daily for 1 day, THEN 4 tablets Daily for 1 day, THEN 3 tablets Daily for 1 day, THEN 2 tablets Daily for 1 day, THEN 1 tablet Daily for 1 day. Take as directed on package instructions.  Start taking on: June 14, 2023               Where to Get Your Medications        These medications were sent to [id number] -  [place] - [address] [facility] [address] - [phone] PH - [phone] FX  [address] [place], [place] [address]      Phone: [phone]   albuterol 0.63 MG/3ML nebulizer solution  brompheniramine-pseudoephedrine-DM 30-2-10 MG/5ML syrup  methylPREDNISolone 4 MG dose pack      Chris Potts MD  [address] [place] [address]  [phone]    In 3 days         Final diagnoses:   Acute viral syndrome   Fever in adult   Cough in adult        ED Disposition       ED Disposition   Discharge    Condition   Stable    Comment   --               This medical record created using voice recognition software.             Lien Cohn PA-C  06/14/23 5764

## 2023-06-19 LAB
BACTERIA SPEC AEROBE CULT: NORMAL
BACTERIA SPEC AEROBE CULT: NORMAL

## 2023-07-17 ENCOUNTER — TELEPHONE (OUTPATIENT)
Dept: OTOLARYNGOLOGY | Facility: CLINIC | Age: 43
End: 2023-07-17

## 2023-07-17 NOTE — TELEPHONE ENCOUNTER
Caller: Rhett Aguirre     Relationship to patient: SELF    Best call back number: [phone]     Patient is needing: PT WAS REFERRED BY ED TO DR LUIS BUT REFERRAL WAS CLOSED AS IT WAS NOTED AS A DENTAL ISSUE.    PT FINISHED HIS ANTIBIOTICS SATURDAY. THE SWELLING HAS MOVED INTO HIS LYMPH NODES/THROAT. PT DID FOLLOW UP WITH DENTIST BUT THEY DECLINED TO DO ANYTHING FOR PT ASN HE HAD A KNOT IN HIS THROAT. PT HAD ABSCESS THAT HAD BUSTED BUT THE SWELLING AND PAIN IS STILL PRESENT.      PT WAS WANTING TO SCHEDULE AN APPT.     PLEASE GIVE PT A CALL TO DISCUSS.

## 2023-09-05 ENCOUNTER — HOSPITAL ENCOUNTER (EMERGENCY)
Facility: HOSPITAL | Age: 43
Discharge: HOME | End: 2023-09-05
Attending: EMERGENCY MEDICINE
Payer: COMMERCIAL

## 2023-09-05 ENCOUNTER — APPOINTMENT (OUTPATIENT)
Dept: GENERAL RADIOLOGY | Facility: HOSPITAL | Age: 43
End: 2023-09-05
Payer: COMMERCIAL

## 2023-09-05 VITALS
HEIGHT: 71 IN | HEART RATE: 72 BPM | SYSTOLIC BLOOD PRESSURE: 136 MMHG | RESPIRATION RATE: 18 BRPM | DIASTOLIC BLOOD PRESSURE: 68 MMHG | BODY MASS INDEX: 28.55 KG/M2 | TEMPERATURE: 98.1 F | OXYGEN SATURATION: 100 % | WEIGHT: 203.93 LBS

## 2023-09-05 DIAGNOSIS — S80.01XA CONTUSION OF RIGHT KNEE AND LOWER LEG, INITIAL ENCOUNTER: ICD-10-CM

## 2023-09-05 DIAGNOSIS — S80.11XA CONTUSION OF RIGHT KNEE AND LOWER LEG, INITIAL ENCOUNTER: ICD-10-CM

## 2023-09-05 DIAGNOSIS — S89.91XA RIGHT KNEE INJURY, INITIAL ENCOUNTER: Primary | ICD-10-CM

## 2023-09-05 PROCEDURE — 73560 X-RAY EXAM OF KNEE 1 OR 2: CPT

## 2023-09-05 PROCEDURE — 99283 EMERGENCY DEPT VISIT LOW MDM: CPT

## 2023-09-05 RX ORDER — DIFLUNISAL 500 MG/1
500 TABLET, FILM COATED ORAL 2 TIMES DAILY
Qty: 30 TABLET | Refills: 0 | Status: SHIPPED | OUTPATIENT
Start: 2023-09-05 | End: 2023-09-11

## 2023-09-05 RX ORDER — HYDROCODONE BITARTRATE AND ACETAMINOPHEN 7.5; 325 MG/1; MG/1
1 TABLET ORAL ONCE
Status: COMPLETED | OUTPATIENT
Start: 2023-09-05 | End: 2023-09-05

## 2023-09-05 RX ADMIN — HYDROCODONE BITARTRATE AND ACETAMINOPHEN 1 TABLET: 7.5; 325 TABLET ORAL at 20:22

## 2023-09-05 NOTE — ED PROVIDER NOTES
Time: 4:55 PM EDT  Date of encounter:  9/5/2023  Independent Historian/Clinical History and Information was obtained by:   Patient    History is limited by: N/A    Chief Complaint   Patient presents with    Fall    Knee Injury         History of Present Illness:  Patient is a 42 y.o. year old male who presents to the emergency department for evaluation of knee pain.  Patient states he was walking out of his shed today and the cinderblock step broke causing him to twist his knee and fall.  Patient is not complaining of right knee pain.  Patient has been able to ambulate but states that it is painful.  (Provider in triage, Jerome Eason PA-C)    The patient presents to the emergency department states that he was walking down some stairs today when his right leg went through the step.  He is complaining of diffuse right knee pain and lateral lower leg pain.  He does have some mild swelling to the lateral calf no bruising or deformity noted.  Not cannot appreciate any significant swelling or bruising to the knee.  Tender to touch.  He states he has been able to bear weight but is painful.  He denies any previous injuries.  He is able to flex and extend the knee but does report increased pain.  He is neurovascular intact.      History provided by:  Patient   used: No      Patient Care Team  Primary Care Provider: Chris Potts MD    Past Medical History:     Allergies   Allergen Reactions    Penicillins Rash and Swelling    Sulfa Antibiotics Swelling    Sulfamethoxazole-Trimethoprim Swelling and Unknown - High Severity    Amoxicillin Unknown - High Severity    Cefdinir Hives     Unknown reaction       Sulfadiazine Unknown - High Severity    Bee Venom Rash    Cephalexin Rash and Unknown - High Severity     Unknown reaction    Sulfacetamide-Sulfur In Urea Rash     Past Medical History:   Diagnosis Date    Arthritis     Depression     Hyperlipidemia     Hypertension     Opioid abuse      Substance abuse     Tobacco abuse      Past Surgical History:   Procedure Laterality Date    BACK SURGERY       History reviewed. No pertinent family history.    Home Medications:  Prior to Admission medications    Medication Sig Start Date End Date Taking? Authorizing Provider   buprenorphine-naloxone (SUBOXONE) 8-2 MG per SL tablet Place 1 tablet under the tongue Daily.    Emergency, Nurse GRACIA Oh   cloNIDine (CATAPRES) 0.3 MG tablet TAKE ONE TABLET BY MOUTH UP TO THREE TIMES DAILY AS NEEDED FOR ANXIETY 1/31/23   Glen Wei MD   EPINEPHrine (EPIPEN) 0.3 MG/0.3ML solution auto-injector injection See Admin Instructions.    Emergency, Nurse GRACIA Oh   fenofibrate (TRICOR) 145 MG tablet Take 1 tablet by mouth Daily. 11/8/22   Glen Wei MD   furosemide (LASIX) 20 MG tablet Take 1 tablet by mouth Daily. 11/8/22   Glen Wei MD   gabapentin (NEURONTIN) 600 MG tablet Take 1 tablet by mouth 3 (Three) Times a Day. 6/5/23   Glen Wei MD   hydroCHLOROthiazide (HYDRODIURIL) 25 MG tablet Take 1 tablet by mouth Daily. 9/3/22   Virginia Kay APRN   ibuprofen (ADVIL,MOTRIN) 800 MG tablet Take 1 tablet by mouth Every 6 (Six) Hours As Needed for Mild Pain. 6/26/23   Krish Barth APRN   ketorolac (TORADOL) 10 MG tablet Take 1 tablet by mouth Every 6 (Six) Hours As Needed for Moderate Pain. 7/3/23   Neyda Mckeon APRN   methylPREDNISolone (MEDROL) 4 MG dose pack Take as directed on package instructions. 7/3/23   Neyda Mckeon APRN   metoprolol succinate XL (TOPROL-XL) 50 MG 24 hr tablet Take 1 tablet by mouth Daily. 11/9/22   Glen Wei MD   mupirocin (BACTROBAN) 2 % ointment Apply 1 application topically to the appropriate area as directed 3 (Three) Times a Day. 7/1/23   Jagdish Abebe,    omeprazole (priLOSEC) 20 MG capsule Prilosec 20 mg oral capsule,delayed release(DR/EC) take 1 capsule (20 mg) by oral route once daily before a meal   Suspended    " Emergency, Nurse Adriano, RN   QUEtiapine Fumarate 150 MG tablet Take 1 tablet by mouth every night at bedtime. 1/30/23   Provider, MD Glen   sildenafil (VIAGRA) 50 MG tablet Take 1 tablet by mouth Daily. 5/22/23   Provider, MD Glen        Social History:   Social History     Tobacco Use    Smoking status: Every Day     Packs/day: 1.00     Years: 20.00     Pack years: 20.00     Types: Cigarettes     Passive exposure: Never    Smokeless tobacco: Never   Vaping Use    Vaping Use: Never used   Substance Use Topics    Alcohol use: Never    Drug use: Not Currently     Comment: h/o heroine opiod dependency         Review of Systems:  Review of Systems   Constitutional:  Negative for chills and fever.   HENT:  Negative for congestion, ear pain and sore throat.    Eyes:  Negative for pain.   Respiratory:  Negative for cough, chest tightness and shortness of breath.    Cardiovascular:  Negative for chest pain.   Gastrointestinal:  Negative for abdominal pain, diarrhea, nausea and vomiting.   Genitourinary:  Negative for flank pain and hematuria.   Musculoskeletal:  Positive for arthralgias, gait problem and myalgias. Negative for joint swelling, neck pain and neck stiffness.   Skin:  Negative for color change and pallor.   Neurological:  Negative for seizures and headaches.   All other systems reviewed and are negative.     Physical Exam:  /68   Pulse 72   Temp 98.1 °F (36.7 °C)   Resp 18   Ht 180.3 cm (71\")   Wt 92.5 kg (203 lb 14.8 oz)   SpO2 100%   BMI 28.44 kg/m²         Physical Exam  Vitals and nursing note reviewed.   Constitutional:       General: He is not in acute distress.     Appearance: Normal appearance. He is not ill-appearing or toxic-appearing.   HENT:      Head: Normocephalic and atraumatic.   Eyes:      General: No scleral icterus.     Conjunctiva/sclera: Conjunctivae normal.      Pupils: Pupils are equal, round, and reactive to light.   Cardiovascular:      Rate and Rhythm: Normal " rate and regular rhythm.      Pulses: Normal pulses.   Pulmonary:      Effort: Pulmonary effort is normal. No respiratory distress.   Musculoskeletal:         General: Tenderness and signs of injury present. No swelling or deformity. Normal range of motion.      Cervical back: Normal range of motion.   Skin:     General: Skin is warm and dry.      Capillary Refill: Capillary refill takes less than 2 seconds.      Coloration: Skin is not cyanotic.      Findings: No bruising or erythema.   Neurological:      General: No focal deficit present.      Mental Status: He is alert and oriented to person, place, and time. Mental status is at baseline.   Psychiatric:         Attention and Perception: Attention and perception normal.         Mood and Affect: Mood normal.         Behavior: Behavior normal.                Procedures:  Procedures      Medical Decision Making:      Comorbidities that affect care:    Hypertension, arthritis, tobacco use, substance abuse, depression, opioid abuse, hyperlipidemia    External Notes reviewed:    None      The following orders were placed and all results were independently analyzed by me:  Orders Placed This Encounter   Procedures    [facility] 05.  Knee Immobilizer, 11.  Crutches    XR Knee 1 or 2 View Right    Elevate Extremity    Apply Ice to Affected Area    Obtain & Apply The Following- Lower extremity; Knee immobilizer    Crutches (fit & training)       Medications Given in the Emergency Department:  Medications   HYDROcodone-acetaminophen (NORCO) 7.5-325 MG per tablet 1 tablet (1 tablet Oral Given 9/5/23 2022)        ED Course:    The patient was initially evaluated in the triage area where orders were placed. The patient was later dispositioned by TERRI Campuzano.      The patient was advised to stay for completion of workup which includes but is not limited to communication of labs and radiological results, reassessment and plan. The patient was advised that  leaving prior to disposition by a provider could result in critical findings that are not communicated to the patient.     ED Course as of 09/05/23 2507   Tue Sep 05, 2023   1656 PROVIDER IN TRIAGE  Patient was evaluated by me in triage, Jerome Eason PA-C.  Orders were placed and patient is currently awaiting final results and disposition.  [MD]      ED Course User Index  [MD] Jerome Eason PA-C       Labs:    Lab Results (last 24 hours)       ** No results found for the last 24 hours. **             Imaging:    XR Knee 1 or 2 View Right    Result Date: 9/5/2023  PROCEDURE: XR KNEE 1 OR 2 VW RIGHT  COMPARISON: [facility], , XR KNEE 4+ VW RIGHT, 2/08/2023, 14:13.  INDICATIONS: RIGHT KNEE PAIN; FALL INJURY X TODAY  FINDINGS:  No fracture or acute malalignment.  Joint spaces appear preserved.  Mineralization appears within normal limits.  No definite knee effusion or acute soft tissue abnormality.        No radiographic findings of acute osseous knee abnormality.      STEVEN MONDRAGON MD       Electronically Signed and Approved By: STEVEN MONDRAGON MD on 9/05/2023 at 17:39                Differential Diagnosis and Discussion:      Joint Pain: Differential diagnosis includes but is not limited to polyarticular arthritis, gout, tendinitis, hemarthrosis, septic arthritis, rheumatoid arthritis, bursitis, degenerative joint disease, joint effusion, autoimmune disorder, trauma, and occult neoplasm.    All X-rays impressions were independently interpreted by me.    MDM  Number of Diagnoses or Management Options  Contusion of right knee and lower leg, initial encounter: new and requires workup  Right knee injury, initial encounter: new and requires workup     Amount and/or Complexity of Data Reviewed  Tests in the radiology section of CPT®: reviewed    Risk of Complications, Morbidity, and/or Mortality  Presenting problems: low  Diagnostic procedures: low  Management options: low    Patient Progress  Patient  progress: stable         Patient Care Considerations:    NARCOTICS: I considered prescribing opiate pain medication as an outpatient, however the patient is in recovery from opioid substance abuse      Consultants/Shared Management Plan:    None    Social Determinants of Health:    Patient is independent, reliable, and has access to care.       Disposition and Care Coordination:    Discharged: The patient is suitable and stable for discharge with no need for consideration of observation or admission.    I have explained the patient´s condition, diagnoses and treatment plan based on the information available to me at this time. I have answered questions and addressed any concerns. The patient has a good  understanding of the patient´s diagnosis, condition, and treatment plan as can be expected at this point. The vital signs have been stable. The patient´s condition is stable and appropriate for discharge from the emergency department.      The patient will pursue further outpatient evaluation with the primary care physician or other designated or consulting physician as outlined in the discharge instructions. They are agreeable to this plan of care and follow-up instructions have been explained in detail. The patient has received these instructions in written format and have expressed an understanding of the discharge instructions. The patient is aware that any significant change in condition or worsening of symptoms should prompt an immediate return to this or the closest emergency department or call to 911.  I have explained discharge medications and the need for follow up with the patient/caretakers. This was also printed in the discharge instructions. Patient was discharged with the following medications and follow up:      Medication List        New Prescriptions      diflunisal 500 MG tablet tablet  Commonly known as: DOLOBID  Take 1 tablet by mouth 2 (Two) Times a Day.               Where to Get Your  Medications        These medications [place] [facility] [place] [address] [facility] [address] - [phone]  - [phone] FX  [address] [facility] [address], [place] [address]      Phone: [phone]   diflunisal 500 MG tablet tablet      Phuc Lindsey MD  [address]  [place] [id number]  [phone]    Call   FOR FOLLOW UP       Final diagnoses:   Right knee injury, initial encounter   Contusion of right knee and lower leg, initial encounter        ED Disposition       ED Disposition   Discharge    Condition   Stable    Comment   --               This medical record created using voice recognition software.             Heidy Tolentino, APRN  09/05/23 3700

## 2023-09-06 NOTE — DISCHARGE INSTRUCTIONS
Rest, ice, and elevate.  Wear your knee immobilizer for stability.  Use your crutches for ambulation.  Take your meds as prescribed.  You may take over-the-counter acetaminophen with your medications as needed for pain.  Call Dr. Lindsey's office in the morning advise them of your ER visit and follow-up with them as directed.  Return to the emergency department for any acutely developing redness, any increase in swelling, any inability to flex or extend her knee or any new or worse concerns.

## 2023-09-07 ENCOUNTER — OFFICE VISIT (OUTPATIENT)
Dept: ORTHOPEDIC SURGERY | Facility: CLINIC | Age: 43
End: 2023-09-07
Payer: COMMERCIAL

## 2023-09-07 VITALS — BODY MASS INDEX: 28.42 KG/M2 | HEIGHT: 71 IN | OXYGEN SATURATION: 97 % | WEIGHT: 203 LBS

## 2023-09-07 DIAGNOSIS — S89.91XA RIGHT KNEE INJURY, INITIAL ENCOUNTER: Primary | ICD-10-CM

## 2023-09-07 NOTE — PROGRESS NOTES
"Chief Complaint  Pain and Initial Evaluation of the Right knee     Subjective      Rhett Aguirre presents to [facility] for evaluation of the right knee. He reports falling on the steps outside and was seen with xrays at the ED. He was given ibuprofen for pain and placed into a brace.     Allergies   Allergen Reactions    Penicillins Rash and Swelling    Sulfa Antibiotics Swelling    Sulfamethoxazole-Trimethoprim Swelling and Unknown - High Severity    Amoxicillin Unknown - High Severity    Cefdinir Hives     Unknown reaction       Sulfadiazine Unknown - High Severity    Bee Venom Rash    Cephalexin Rash and Unknown - High Severity     Unknown reaction    Sulfacetamide-Sulfur In Urea Rash        Social History     Socioeconomic History    Marital status: Single   Tobacco Use    Smoking status: Every Day     Packs/day: 1.00     Years: 20.00     Pack years: 20.00     Types: Cigarettes     Passive exposure: Never    Smokeless tobacco: Never   Vaping Use    Vaping Use: Never used   Substance and Sexual Activity    Alcohol use: Never    Drug use: Not Currently     Comment: h/o heroine opiod dependency    Sexual activity: Defer        I reviewed the patient's chief complaint, history of present illness, review of systems, past medical history, surgical history, family history, social history, medications, and allergy list.     Review of Systems     Constitutional: Denies fevers, chills, weight loss  Cardiovascular: Denies chest pain, shortness of breath  Skin: Denies rashes, acute skin changes  Neurologic: Denies headache, loss of consciousness  MSK: right knee      Vital Signs:   Ht 180.3 cm (71\")   Wt 92.1 kg (203 lb)   SpO2 97%   BMI 28.31 kg/m²          Physical Exam  General: Alert. No acute distress    Ortho Exam      Right knee: able to hold leg raise, -5 extension,mild swelling, flexion 80 degrees, neuro intact, sensation intact, tender to anterior and posterior " knee,Stable to anterior and posterior drawer, stable to varus and valgus stress, mildly positive Lachman and Myke's, ambulates with antalgic gait    Procedures        Imaging Results (Most Recent)       None             Result Review :       XR Knee 1 or 2 View Right    Result Date: 9/5/2023  Narrative: PROCEDURE: XR KNEE 1 OR 2 VW RIGHT  COMPARISON: [facility], CR, XR KNEE 4+ VW RIGHT, 2/08/2023, 14:13.  INDICATIONS: RIGHT KNEE PAIN; FALL INJURY X TODAY  FINDINGS:  No fracture or acute malalignment.  Joint spaces appear preserved.  Mineralization appears within normal limits.  No definite knee effusion or acute soft tissue abnormality.      Impression:   No radiographic findings of acute osseous knee abnormality.      STEVEN MONDRAGON MD       Electronically Signed and Approved By: STEVEN MONDRAGON MD on 9/05/2023 at 17:39                     Assessment and Plan     Diagnoses and all orders for this visit:    1. Right knee injury, initial encounter (Primary)  -     MRI Knee Right Without Contrast; Future        We recommended an MRI to rule out a tear. Will continue with brace and crutches.     Call or return if worsening symptoms.    Follow Up     After MRI      Patient was given instructions and counseling regarding his condition or for health maintenance advice. Please see specific information pulled into the AVS if appropriate.     Scribed for Phuc Lindsey MD by Bronwyn Correa.  09/07/23   11:14 EDT    I have personally performed the services described in this document as scribed by the above individual and it is both accurate and complete. Phuc Lindsey MD 09/07/23

## 2023-09-11 ENCOUNTER — HOSPITAL ENCOUNTER (OUTPATIENT)
Dept: CARDIOLOGY | Facility: HOSPITAL | Age: 43
Discharge: HOME | End: 2023-09-11
Admitting: PHYSICIAN ASSISTANT
Payer: COMMERCIAL

## 2023-09-11 ENCOUNTER — OFFICE VISIT (OUTPATIENT)
Dept: ORTHOPEDIC SURGERY | Facility: CLINIC | Age: 43
End: 2023-09-11
Payer: COMMERCIAL

## 2023-09-11 VITALS
SYSTOLIC BLOOD PRESSURE: 121 MMHG | HEART RATE: 76 BPM | WEIGHT: 203.04 LBS | BODY MASS INDEX: 28.43 KG/M2 | DIASTOLIC BLOOD PRESSURE: 67 MMHG | OXYGEN SATURATION: 96 % | HEIGHT: 71 IN

## 2023-09-11 DIAGNOSIS — M79.662 PAIN OF LEFT CALF: ICD-10-CM

## 2023-09-11 DIAGNOSIS — M25.561 RIGHT KNEE PAIN, UNSPECIFIED CHRONICITY: ICD-10-CM

## 2023-09-11 DIAGNOSIS — M79.661 RIGHT CALF PAIN: ICD-10-CM

## 2023-09-11 DIAGNOSIS — S89.91XA RIGHT KNEE INJURY, INITIAL ENCOUNTER: Primary | ICD-10-CM

## 2023-09-11 LAB
BH CV LOWER VASCULAR LEFT COMMON FEMORAL AUGMENT: NORMAL
BH CV LOWER VASCULAR LEFT COMMON FEMORAL COMPETENT: NORMAL
BH CV LOWER VASCULAR LEFT COMMON FEMORAL COMPRESS: NORMAL
BH CV LOWER VASCULAR LEFT COMMON FEMORAL PHASIC: NORMAL
BH CV LOWER VASCULAR LEFT COMMON FEMORAL SPONT: NORMAL
BH CV LOWER VASCULAR LEFT DISTAL FEMORAL COMPRESS: NORMAL
BH CV LOWER VASCULAR LEFT GASTRONEMIUS COMPRESS: NORMAL
BH CV LOWER VASCULAR LEFT GREATER SAPH AK AUGMENT: NORMAL
BH CV LOWER VASCULAR LEFT GREATER SAPH AK COMPETENT: NORMAL
BH CV LOWER VASCULAR LEFT GREATER SAPH AK COMPRESS: NORMAL
BH CV LOWER VASCULAR LEFT GREATER SAPH AK PHASIC: NORMAL
BH CV LOWER VASCULAR LEFT GREATER SAPH AK SPONT: NORMAL
BH CV LOWER VASCULAR LEFT GREATER SAPH BK COMPRESS: NORMAL
BH CV LOWER VASCULAR LEFT LESSER SAPH COMPRESS: NORMAL
BH CV LOWER VASCULAR LEFT MID FEMORAL AUGMENT: NORMAL
BH CV LOWER VASCULAR LEFT MID FEMORAL COMPETENT: NORMAL
BH CV LOWER VASCULAR LEFT MID FEMORAL COMPRESS: NORMAL
BH CV LOWER VASCULAR LEFT MID FEMORAL PHASIC: NORMAL
BH CV LOWER VASCULAR LEFT MID FEMORAL SPONT: NORMAL
BH CV LOWER VASCULAR LEFT PERONEAL AUGMENT: NORMAL
BH CV LOWER VASCULAR LEFT PERONEAL COMPETENT: NORMAL
BH CV LOWER VASCULAR LEFT PERONEAL COMPRESS: NORMAL
BH CV LOWER VASCULAR LEFT PERONEAL PHASIC: NORMAL
BH CV LOWER VASCULAR LEFT PERONEAL SPONT: NORMAL
BH CV LOWER VASCULAR LEFT POPLITEAL AUGMENT: NORMAL
BH CV LOWER VASCULAR LEFT POPLITEAL COMPETENT: NORMAL
BH CV LOWER VASCULAR LEFT POPLITEAL COMPRESS: NORMAL
BH CV LOWER VASCULAR LEFT POPLITEAL PHASIC: NORMAL
BH CV LOWER VASCULAR LEFT POPLITEAL SPONT: NORMAL
BH CV LOWER VASCULAR LEFT POSTERIOR TIBIAL AUGMENT: NORMAL
BH CV LOWER VASCULAR LEFT POSTERIOR TIBIAL COMPETENT: NORMAL
BH CV LOWER VASCULAR LEFT POSTERIOR TIBIAL COMPRESS: NORMAL
BH CV LOWER VASCULAR LEFT POSTERIOR TIBIAL PHASIC: NORMAL
BH CV LOWER VASCULAR LEFT POSTERIOR TIBIAL SPONT: NORMAL
BH CV LOWER VASCULAR LEFT PROXIMAL FEMORAL COMPRESS: NORMAL
BH CV LOWER VASCULAR RIGHT COMMON FEMORAL AUGMENT: NORMAL
BH CV LOWER VASCULAR RIGHT COMMON FEMORAL COMPETENT: NORMAL
BH CV LOWER VASCULAR RIGHT COMMON FEMORAL COMPRESS: NORMAL
BH CV LOWER VASCULAR RIGHT COMMON FEMORAL PHASIC: NORMAL
BH CV LOWER VASCULAR RIGHT COMMON FEMORAL SPONT: NORMAL
BH CV LOWER VASCULAR RIGHT DISTAL FEMORAL COMPRESS: NORMAL
BH CV LOWER VASCULAR RIGHT GASTRONEMIUS COMPRESS: NORMAL
BH CV LOWER VASCULAR RIGHT GREATER SAPH AK AUGMENT: NORMAL
BH CV LOWER VASCULAR RIGHT GREATER SAPH AK COMPETENT: NORMAL
BH CV LOWER VASCULAR RIGHT GREATER SAPH AK COMPRESS: NORMAL
BH CV LOWER VASCULAR RIGHT GREATER SAPH AK PHASIC: NORMAL
BH CV LOWER VASCULAR RIGHT GREATER SAPH AK SPONT: NORMAL
BH CV LOWER VASCULAR RIGHT GREATER SAPH BK COMPRESS: NORMAL
BH CV LOWER VASCULAR RIGHT LESSER SAPH COMPRESS: NORMAL
BH CV LOWER VASCULAR RIGHT MID FEMORAL AUGMENT: NORMAL
BH CV LOWER VASCULAR RIGHT MID FEMORAL COMPETENT: NORMAL
BH CV LOWER VASCULAR RIGHT MID FEMORAL COMPRESS: NORMAL
BH CV LOWER VASCULAR RIGHT MID FEMORAL PHASIC: NORMAL
BH CV LOWER VASCULAR RIGHT MID FEMORAL SPONT: NORMAL
BH CV LOWER VASCULAR RIGHT PERONEAL AUGMENT: NORMAL
BH CV LOWER VASCULAR RIGHT PERONEAL COMPETENT: NORMAL
BH CV LOWER VASCULAR RIGHT PERONEAL COMPRESS: NORMAL
BH CV LOWER VASCULAR RIGHT PERONEAL PHASIC: NORMAL
BH CV LOWER VASCULAR RIGHT PERONEAL SPONT: NORMAL
BH CV LOWER VASCULAR RIGHT POPLITEAL AUGMENT: NORMAL
BH CV LOWER VASCULAR RIGHT POPLITEAL COMPETENT: NORMAL
BH CV LOWER VASCULAR RIGHT POPLITEAL COMPRESS: NORMAL
BH CV LOWER VASCULAR RIGHT POPLITEAL PHASIC: NORMAL
BH CV LOWER VASCULAR RIGHT POPLITEAL SPONT: NORMAL
BH CV LOWER VASCULAR RIGHT POSTERIOR TIBIAL AUGMENT: NORMAL
BH CV LOWER VASCULAR RIGHT POSTERIOR TIBIAL COMPETENT: NORMAL
BH CV LOWER VASCULAR RIGHT POSTERIOR TIBIAL COMPRESS: NORMAL
BH CV LOWER VASCULAR RIGHT POSTERIOR TIBIAL PHASIC: NORMAL
BH CV LOWER VASCULAR RIGHT POSTERIOR TIBIAL SPONT: NORMAL
BH CV LOWER VASCULAR RIGHT PROXIMAL FEMORAL COMPRESS: NORMAL

## 2023-09-11 PROCEDURE — 93970 EXTREMITY STUDY: CPT | Performed by: SURGERY

## 2023-09-11 PROCEDURE — 93970 EXTREMITY STUDY: CPT

## 2023-09-11 RX ORDER — QUETIAPINE FUMARATE 200 MG/1
TABLET, FILM COATED ORAL
COMMUNITY
Start: 2023-09-11

## 2023-09-11 RX ORDER — PANTOPRAZOLE SODIUM 40 MG/1
TABLET, DELAYED RELEASE ORAL
COMMUNITY

## 2023-09-11 RX ORDER — HYDROXYZINE PAMOATE 100 MG/1
CAPSULE ORAL
COMMUNITY
Start: 2023-09-01

## 2023-09-11 RX ORDER — VENLAFAXINE HYDROCHLORIDE 75 MG/1
75 CAPSULE, EXTENDED RELEASE ORAL DAILY
COMMUNITY
Start: 2023-09-01

## 2023-09-11 RX ORDER — BUPRENORPHINE AND NALOXONE 8; 2 MG/1; MG/1
FILM, SOLUBLE BUCCAL; SUBLINGUAL
COMMUNITY
Start: 2023-09-02

## 2023-09-11 RX ORDER — DICLOFENAC SODIUM 75 MG/1
75 TABLET, DELAYED RELEASE ORAL 2 TIMES DAILY
Qty: 60 TABLET | Refills: 2 | Status: SHIPPED | OUTPATIENT
Start: 2023-09-11

## 2023-09-11 NOTE — PROGRESS NOTES
Chief Complaint  Pain and Follow-up of the Right Knee    Subjective          History of Present Illness      Rhett Aguirre is a 42 y.o. male  presents to [facility] for     Patient presents for follow-up evaluation of right knee pain, right knee injury he was just seen by Dr. Lindsey on 9/7/2023.  Dr. Lindsey evaluated the patient and ordered MRI for the knee he is on crutches and using a brace.  He states that Saturday his bilateral lower extremities have increased with swelling he admits to calf pain and swelling in his bilateral lower extremities he has been babying his knee, nonweightbearing since his injury.  He admits to pain in bilateral lower extremities and bilateral calfs region.  He is also concerned that his MRI for his knee is not happening soon enough it is happening/scheduled for October      Allergies   Allergen Reactions    Penicillins Rash and Swelling    Sulfa Antibiotics Swelling and Rash    Sulfamethoxazole-Trimethoprim Swelling, Unknown - High Severity and Rash    Cefdinir Hives     Unknown reaction    Sulfadiazine Unknown - High Severity    Amoxicillin Unknown - High Severity, Swelling and Rash    Bee Venom Rash and Hives    Cephalexin Rash, Unknown - High Severity and Hives     Unknown reaction    Sulfacetamide-Sulfur In Urea Rash        Social History     Socioeconomic History    Marital status: Single   Tobacco Use    Smoking status: Every Day     Packs/day: 1.00     Years: 20.00     Pack years: 20.00     Types: Cigarettes     Passive exposure: Never    Smokeless tobacco: Never   Vaping Use    Vaping Use: Never used   Substance and Sexual Activity    Alcohol use: Never    Drug use: Not Currently     Comment: h/o heroine opiod dependency    Sexual activity: Defer        REVIEW OF SYSTEMS    Constitutional: Denies fevers, chills, weight loss  Cardiovascular: Denies chest pain, shortness of breath  Skin: Denies rashes, acute skin  "changes  Neurologic: Denies headache, loss of consciousness  MSK: Bilateral lower extremity swelling, right knee pain      Objective   Vital Signs:   /67   Pulse 76   Ht 180.3 cm (71\")   Wt 92.1 kg (203 lb 0.7 oz)   SpO2 96%   BMI 28.32 kg/m²     Body mass index is 28.32 kg/m².    Physical Exam         Bilateral lower extremities, 2+ pitting edema, tender to palpation of bilateral calf region, pain with Jeannie testing bilaterally, right knee: Able hold straight leg raise, -5 extension, flexion 80 with pain, positive Lachman and Myke testing ambulates with antalgic gait using crutches        Procedures    Imaging Results (Most Recent)       None           XR Knee 1 or 2 View Right    Result Date: 9/5/2023  Narrative: PROCEDURE: XR KNEE 1 OR 2 VW RIGHT  COMPARISON: [facility], , XR KNEE 4+ VW RIGHT, 2/08/2023, 14:13.  INDICATIONS: RIGHT KNEE PAIN; FALL INJURY X TODAY  FINDINGS:  No fracture or acute malalignment.  Joint spaces appear preserved.  Mineralization appears within normal limits.  No definite knee effusion or acute soft tissue abnormality.      Impression:   No radiographic findings of acute osseous knee abnormality.      STEVEN MONDRAGON MD       Electronically Signed and Approved By: STEVEN MONDRAGON MD on 9/05/2023 at 17:39              Result Review :   The following data was reviewed by: NATALY Mcclain on 09/11/2023:               Assessment and Plan    Diagnoses and all orders for this visit:    1. Right knee injury, initial encounter (Primary)  -     MRI Knee Right Without Contrast; Future    2. Right knee pain, unspecified chronicity  -     MRI Knee Right Without Contrast; Future    3. Right calf pain  -     Duplex Venous Lower Extremity - Left CAR; Future  -     Duplex Venous Lower Extremity - Right CAR; Future  -     Duplex Venous Lower Extremity - Bilateral CAR; Future    4. Pain of left calf  -     Duplex Venous Lower Extremity - Left CAR; Future  -     " Duplex Venous Lower Extremity - Right CAR; Future  -     Duplex Venous Lower Extremity - Bilateral CAR; Future    Other orders  -     diclofenac (VOLTAREN) 75 MG EC tablet; Take 1 tablet by mouth 2 (Two) Times a Day.  Dispense: 60 tablet; Refill: 2        Discussed diagnosis and treatment options with the patient due to patient presentation, returning so soon after being evaluated by the doctor, the patient was advised we will order bilateral stat ultrasounds to rule out DVT, we will order stat MRI of the knee for further evaluation follow-up after after MRI.  Patient was given diclofenac prescription    Call or return if worsening symptoms.    Follow Up   Return for After MRI.  And after ultrasounds, patient will be called with his results  Patient was given instructions and counseling regarding his condition or for health maintenance advice. Please see specific information pulled into the AVS if appropriate.

## 2023-09-12 ENCOUNTER — HOSPITAL ENCOUNTER (OUTPATIENT)
Dept: MRI IMAGING | Facility: HOSPITAL | Age: 43
Discharge: HOME | End: 2023-09-12
Admitting: PHYSICIAN ASSISTANT
Payer: COMMERCIAL

## 2023-09-12 DIAGNOSIS — S89.91XA RIGHT KNEE INJURY, INITIAL ENCOUNTER: ICD-10-CM

## 2023-09-12 DIAGNOSIS — M25.561 RIGHT KNEE PAIN, UNSPECIFIED CHRONICITY: ICD-10-CM

## 2023-09-12 PROCEDURE — 73721 MRI JNT OF LWR EXTRE W/O DYE: CPT

## 2023-09-13 ENCOUNTER — DOCUMENTATION (OUTPATIENT)
Dept: ORTHOPEDIC SURGERY | Facility: CLINIC | Age: 43
End: 2023-09-13
Payer: COMMERCIAL

## 2023-09-13 NOTE — PROGRESS NOTES
I was able to review patient MRI results which was a stat MRI once I was able to get a to a computer this afternoon after working in surgery all day, I spoke with patient at 4:25 PM on 9/13/2023 to review his MRI results with him letting him know that he has a tibial plateau fracture nondisplaced.  Patient was advised we recommend nonweightbearing and he was also advised to follow-up tomorrow morning first thing in the morning at 8 AM to review results in person.  For now patient was advised to try to stay off of his leg, continue bracing and follow-up tomorrow 8 AM, patient expressed understanding.    XR Knee 1 or 2 View Right    Result Date: 9/5/2023  Narrative: PROCEDURE: XR KNEE 1 OR 2 VW RIGHT  COMPARISON: [facility], , XR KNEE 4+ VW RIGHT, 2/08/2023, 14:13.  INDICATIONS: RIGHT KNEE PAIN; FALL INJURY X TODAY  FINDINGS:  No fracture or acute malalignment.  Joint spaces appear preserved.  Mineralization appears within normal limits.  No definite knee effusion or acute soft tissue abnormality.      Impression:   No radiographic findings of acute osseous knee abnormality.      STEVEN MONDRAGON MD       Electronically Signed and Approved By: STEVEN MONDRAGON MD on 9/05/2023 at 17:39             MRI Knee Right Without Contrast    Result Date: 9/12/2023  Narrative: PROCEDURE: MRI KNEE RIGHT  WO CONTRAST  COMPARISON: [facility], , MRI RIGHT KNEE WO CONTRAST, 11/08/2016, 11:35.  INDICATIONS: GENERALIZED RIGHT KNEE PAIN, SWELLING AND OFF/ON WEAKNESS. PATIENT FELL TWISTING AND HYPERFLEXING KNEE UNDER HIM      TECHNIQUE: A complete multi-planar MRI was performed.   FINDINGS:  The cruciate ligaments, collateral ligaments, and extensor mechanism of the knee are intact. There is no evidence for medial or lateral meniscal tear.  Mild tricompartmental osteoarthritic degenerative changes are identified with evidence for articular cartilage irregularity/fissuring, subchondral edema/cystic  change, and osteophytosis.  No significant full-thickness articular cartilage loss is observed.  There is evidence for a subtle nondisplaced fracture at the posterior margin of the medial tibial plateau.  Surrounding marrow edema is noted.  No significant joint effusion is observed.  A small Baker's cyst is noted.      Impression:   1. No evidence for ligament or tendon derangement. 2. There is a nondisplaced fracture of the posterior margin of the medial tibial plateau. 3. Mild tricompartmental osteoarthritis..       ZOYA DUEÑAS MD       Electronically Signed and Approved By: ZOYA DUEÑAS MD on 9/12/2023 at 22:21             Duplex Venous Lower Extremity - Bilateral CAR    Result Date: 9/11/2023  Narrative:   Normal bilateral lower extremity venous duplex scan.

## 2023-09-14 ENCOUNTER — OFFICE VISIT (OUTPATIENT)
Dept: ORTHOPEDIC SURGERY | Facility: CLINIC | Age: 43
End: 2023-09-14
Payer: COMMERCIAL

## 2023-09-14 VITALS — BODY MASS INDEX: 28.42 KG/M2 | WEIGHT: 203 LBS | HEIGHT: 71 IN

## 2023-09-14 DIAGNOSIS — S82.141A CLOSED FRACTURE OF RIGHT TIBIAL PLATEAU, INITIAL ENCOUNTER: ICD-10-CM

## 2023-09-14 DIAGNOSIS — S89.91XA RIGHT KNEE INJURY, INITIAL ENCOUNTER: Primary | ICD-10-CM

## 2023-09-14 DIAGNOSIS — M25.561 RIGHT KNEE PAIN, UNSPECIFIED CHRONICITY: ICD-10-CM

## 2023-09-14 RX ORDER — TRAMADOL HYDROCHLORIDE 50 MG/1
50 TABLET ORAL EVERY 8 HOURS PRN
Qty: 21 TABLET | Refills: 0 | Status: SHIPPED | OUTPATIENT
Start: 2023-09-14

## 2023-09-14 NOTE — PROGRESS NOTES
Chief Complaint  Pain and Follow-up of the Right Knee    Subjective          History of Present Illness      Rhett Aguirre is a 42 y.o. male  presents to [facility] for     Patient presents for follow-up evaluation of right knee pain, right knee injury and to review stat MRI of the right knee.  Patient was seen last week by Dr. Lindsey due to a knee injury he was seen several days ago again requesting that a updated MRI order be placed due to his increased pain to the knee and swelling.  He was advised to follow-up with his primary care physician regarding bilateral lower extremity swelling he had negative ultrasound for DVT at at the hospital when he was checked 2 days ago.  Patient presents and brace with crutches he is been nonweightbearing due to the pain.  He was advised to follow-up today when I saw his MRI results I called him and had him come in this morning for evaluation.  He is taking diclofenac with no relief      Allergies   Allergen Reactions    Penicillins Rash and Swelling    Sulfa Antibiotics Swelling and Rash    Sulfamethoxazole-Trimethoprim Swelling, Unknown - High Severity and Rash    Cefdinir Hives     Unknown reaction    Sulfadiazine Unknown - High Severity    Amoxicillin Unknown - High Severity, Swelling and Rash    Bee Venom Rash and Hives    Cephalexin Rash, Unknown - High Severity and Hives     Unknown reaction    Sulfacetamide-Sulfur In Urea Rash        Social History     Socioeconomic History    Marital status: Single   Tobacco Use    Smoking status: Every Day     Packs/day: 1.00     Years: 20.00     Pack years: 20.00     Types: Cigarettes     Passive exposure: Never    Smokeless tobacco: Never   Vaping Use    Vaping Use: Never used   Substance and Sexual Activity    Alcohol use: Never    Drug use: Not Currently     Comment: h/o heroine opiod dependency    Sexual activity: Defer        REVIEW OF SYSTEMS    Constitutional: Denies fevers, chills,  "weight loss  Cardiovascular: Denies chest pain, shortness of breath  Skin: Denies rashes, acute skin changes  Neurologic: Denies headache, loss of consciousness  MSK: Right knee pain      Objective   Vital Signs:   Ht 180.3 cm (71\")   Wt 92.1 kg (203 lb)   BMI 28.31 kg/m²     Body mass index is 28.31 kg/m².    Physical Exam         Bilateral lower extremities, 2+ pitting edema, tender to palpation of bilateral calf region, pain with Jeannie testing bilaterally, right knee: Able hold straight leg raise, -5 extension, flexion 80 with pain, positive Lachman and Myke testing ambulates with antalgic gait using crutches         Procedures    Imaging Results (Most Recent)       None           XR Knee 1 or 2 View Right    Result Date: 9/5/2023  Narrative: PROCEDURE: XR KNEE 1 OR 2 VW RIGHT  COMPARISON: [facility], , XR KNEE 4+ VW RIGHT, 2/08/2023, 14:13.  INDICATIONS: RIGHT KNEE PAIN; FALL INJURY X TODAY  FINDINGS:  No fracture or acute malalignment.  Joint spaces appear preserved.  Mineralization appears within normal limits.  No definite knee effusion or acute soft tissue abnormality.      Impression:   No radiographic findings of acute osseous knee abnormality.      STEVEN MONDRAGON MD       Electronically Signed and Approved By: STEVEN MONDRAGON MD on 9/05/2023 at 17:39             MRI Knee Right Without Contrast    Result Date: 9/12/2023  Narrative: PROCEDURE: MRI KNEE RIGHT  WO CONTRAST  COMPARISON: [facility], MR, MRI RIGHT KNEE WO CONTRAST, 11/08/2016, 11:35.  INDICATIONS: GENERALIZED RIGHT KNEE PAIN, SWELLING AND OFF/ON WEAKNESS. PATIENT FELL TWISTING AND HYPERFLEXING KNEE UNDER HIM      TECHNIQUE: A complete multi-planar MRI was performed.   FINDINGS:  The cruciate ligaments, collateral ligaments, and extensor mechanism of the knee are intact. There is no evidence for medial or lateral meniscal tear.  Mild tricompartmental osteoarthritic degenerative changes are identified with " evidence for articular cartilage irregularity/fissuring, subchondral edema/cystic change, and osteophytosis.  No significant full-thickness articular cartilage loss is observed.  There is evidence for a subtle nondisplaced fracture at the posterior margin of the medial tibial plateau.  Surrounding marrow edema is noted.  No significant joint effusion is observed.  A small Baker's cyst is noted.      Impression:   1. No evidence for ligament or tendon derangement. 2. There is a nondisplaced fracture of the posterior margin of the medial tibial plateau. 3. Mild tricompartmental osteoarthritis..       ZOYA DUEÑAS MD       Electronically Signed and Approved By: ZOYA DUEÑAS MD on 9/12/2023 at 22:21             Duplex Venous Lower Extremity - Bilateral CAR    Result Date: 9/11/2023  Narrative:   Normal bilateral lower extremity venous duplex scan.      Result Review :   The following data was reviewed by: NATALY Mcclain on 09/14/2023:  Data reviewed : Radiologic studies reviewed by me with the patient Dr. Lindsey also reviewed and agrees with plan              Assessment and Plan    Diagnoses and all orders for this visit:    1. Right knee injury, initial encounter (Primary)    2. Right knee pain, unspecified chronicity    3. Closed fracture of right tibial plateau, initial encounter        Reviewed MRI with the patient, Dr. Lindsey also reviewed MRI and patient was advised of findings consistent with tibial plateau fracture he was advised to stay off his knee, only toe-touch weightbearing with crutches assistance for the next 6 to 8 weeks, he has a knee brace he was advised to use the knee brace and crutches for ambulation, he was given short course of tramadol as needed for pain, he will continue plan for follow-up with his primary care physician for bilateral lower extremity swelling he has an appointment tomorrow follow-up in 4 weeks for recheck with x-rays of the knee.  May consider  starting therapy at that visit    Call or return if worsening symptoms.    Follow Up   Return in about 4 weeks (around 10/12/2023) for Recheck.  Patient was given instructions and counseling regarding his condition or for health maintenance advice. Please see specific information pulled into the AVS if appropriate.

## 2023-10-02 DIAGNOSIS — S89.91XA RIGHT KNEE INJURY, INITIAL ENCOUNTER: ICD-10-CM

## 2023-10-03 RX ORDER — TRAMADOL HYDROCHLORIDE 50 MG/1
50 TABLET ORAL EVERY 8 HOURS PRN
Qty: 21 TABLET | Refills: 0 | OUTPATIENT
Start: 2023-10-03

## 2023-10-09 ENCOUNTER — OFFICE VISIT (OUTPATIENT)
Dept: ORTHOPEDIC SURGERY | Facility: CLINIC | Age: 43
End: 2023-10-09
Payer: COMMERCIAL

## 2023-10-09 VITALS
HEIGHT: 71 IN | HEART RATE: 73 BPM | SYSTOLIC BLOOD PRESSURE: 104 MMHG | DIASTOLIC BLOOD PRESSURE: 72 MMHG | OXYGEN SATURATION: 96 % | BODY MASS INDEX: 28.42 KG/M2 | WEIGHT: 203 LBS

## 2023-10-09 DIAGNOSIS — S89.91XA RIGHT KNEE INJURY, INITIAL ENCOUNTER: Primary | ICD-10-CM

## 2023-10-09 DIAGNOSIS — S82.141D CLOSED FRACTURE OF RIGHT TIBIAL PLATEAU WITH ROUTINE HEALING, SUBSEQUENT ENCOUNTER: ICD-10-CM

## 2023-10-09 DIAGNOSIS — M25.561 RIGHT KNEE PAIN, UNSPECIFIED CHRONICITY: ICD-10-CM

## 2023-10-09 DIAGNOSIS — M25.561 RIGHT KNEE PAIN, UNSPECIFIED CHRONICITY: Primary | ICD-10-CM

## 2023-10-09 NOTE — PROGRESS NOTES
Chief Complaint  Pain and Follow-up of the Right Knee    Subjective          History of Present Illness      Rhett Aguirre is a 42 y.o. male  presents to [facility] for     Patient presents for follow-up evaluation of right knee pain, right knee injury and right tibial plateau fracture.  At last visit we reviewed his MRI to discuss findings of tibial plateau fracture which were not seen on x-ray.  Patient was advised to protect his weightbearing with crutches and a brace.  He states that he started weightbearing at home and going up and down stairs for the last 10 days he states he has been tolerating it well with just the brace.  He admits to increased pain about 3 to 4 days ago when he was at his job he is a  and he states he was kneeling and this caused pain to his right knee he points anterior medial knee as his area of pain.  He presents today without crutches he is using a brace mainly.  He takes diclofenac with some relief.  He had bilateral lower extremity swelling and he has seen his primary care physician and this has been fixed with medication adjustment      Allergies   Allergen Reactions    Penicillins Rash and Swelling    Sulfa Antibiotics Swelling and Rash    Sulfamethoxazole-Trimethoprim Swelling, Unknown - High Severity and Rash    Cefdinir Hives     Unknown reaction    Sulfadiazine Unknown - High Severity    Amoxicillin Unknown - High Severity, Swelling and Rash    Bee Venom Rash and Hives    Cephalexin Rash, Unknown - High Severity and Hives     Unknown reaction    Sulfacetamide-Sulfur In Urea Rash        Social History     Socioeconomic History    Marital status: Single   Tobacco Use    Smoking status: Every Day     Packs/day: 1.00     Years: 20.00     Additional pack years: 0.00     Total pack years: 20.00     Types: Cigarettes     Passive exposure: Never    Smokeless tobacco: Never   Vaping Use    Vaping Use: Never used   Substance and Sexual  "Activity    Alcohol use: Never    Drug use: Not Currently     Comment: h/o heroine opiod dependency    Sexual activity: Defer        REVIEW OF SYSTEMS    Constitutional: Awake alert and oriented x3, no acute distress, denies fevers, chills, weight loss  Respiratory: No respiratory distress  Vascular: Brisk cap refill, Intact distal pulses, No cyanosis, compartments soft with no signs or symptoms of compartment syndrome or DVT.   Cardiovascular: Denies chest pain, shortness of breath  Skin: Denies rashes, acute skin changes  Neurologic: Denies headache, loss of consciousness  MSK: Right knee pain      Objective   Vital Signs:   /72   Pulse 73   Ht 180.3 cm (71\")   Wt 92.1 kg (203 lb)   SpO2 96%   BMI 28.31 kg/mý     Body mass index is 28.31 kg/mý.    Physical Exam       Right knee: Mild tenderness palpation anterior knee, no swelling, no erythema, no effusion or signs of infection, full extension flexion 100, mild pain with flexion, neurovascular intact, stable to varus/valgus stress stable anterior/posterior drawer    Procedures    Imaging Results (Most Recent)       Procedure Component Value Units Date/Time    XR Knee AP & Lateral [[id number]] Resulted: 10/09/23 0953     Updated: 10/09/23 0954    Narrative:      View:AP/Lateral view(s)  Site: Right knee  Indication: Right knee pain  Study: X-rays ordered, taken in the office, and reviewed today  X-ray: Good healing of tibial plateau fracture fracture alignment remains   stable compared to previous studies  Comparative data: Previous studies             Result Review :   The following data was reviewed by: NATALY Mcclain on 10/09/2023:  Data reviewed : Radiologic studies reviewed by me with the patient              Assessment and Plan    Diagnoses and all orders for this visit:    1. Right knee injury, initial encounter (Primary)  -     XR Knee AP & Lateral  -     Ambulatory Referral to Physical Therapy Evaluate and treat (2-3x/wek for 6-8 " weeks)    2. Right knee pain, unspecified chronicity  -     Ambulatory Referral to Physical Therapy Evaluate and treat (2-3x/wek for 6-8 weeks)    3. Closed fracture of right tibial plateau with routine healing, subsequent encounter  -     Ambulatory Referral to Physical Therapy Evaluate and treat (2-3x/wek for 6-8 weeks)        Reviewed x-rays with the patient discussed diagnosis and treatment options with him he was advised we recommend continue protected weightbearing, continue brace and crutches use, no stairs ladders kneeling at his work, follow-up in 4 weeks for recheck, continue diclofenac    Call or return if worsening symptoms.    Follow Up   Return in about 4 weeks (around 11/6/2023) for Recheck.  Patient was given instructions and counseling regarding his condition or for health maintenance advice. Please see specific information pulled into the AVS if appropriate.

## 2024-04-26 ENCOUNTER — APPOINTMENT (OUTPATIENT)
Dept: CT IMAGING | Facility: HOSPITAL | Age: 44
DRG: 193 | End: 2024-04-26
Payer: COMMERCIAL

## 2024-04-26 ENCOUNTER — APPOINTMENT (OUTPATIENT)
Dept: GENERAL RADIOLOGY | Facility: HOSPITAL | Age: 44
DRG: 193 | End: 2024-04-26
Payer: COMMERCIAL

## 2024-04-26 ENCOUNTER — HOSPITAL ENCOUNTER (INPATIENT)
Facility: HOSPITAL | Age: 44
LOS: 7 days | Discharge: HOME | DRG: 193 | End: 2024-05-03
Attending: EMERGENCY MEDICINE | Admitting: STUDENT IN AN ORGANIZED HEALTH CARE EDUCATION/TRAINING PROGRAM
Payer: COMMERCIAL

## 2024-04-26 DIAGNOSIS — J85.1 ABSCESS OF LOWER LOBE OF LEFT LUNG WITH PNEUMONIA: ICD-10-CM

## 2024-04-26 DIAGNOSIS — A41.9 SEPSIS WITHOUT ACUTE ORGAN DYSFUNCTION, DUE TO UNSPECIFIED ORGANISM: ICD-10-CM

## 2024-04-26 DIAGNOSIS — R50.9 ACUTE FEBRILE ILLNESS: ICD-10-CM

## 2024-04-26 DIAGNOSIS — J18.9 PNEUMONIA OF LEFT LOWER LOBE DUE TO INFECTIOUS ORGANISM: Primary | ICD-10-CM

## 2024-04-26 LAB
ALBUMIN SERPL-MCNC: 3 G/DL (ref 3.5–5.2)
ALBUMIN/GLOB SERPL: 0.8 G/DL
ALP SERPL-CCNC: 130 U/L (ref 39–117)
ALT SERPL W P-5'-P-CCNC: 36 U/L (ref 1–41)
ANION GAP SERPL CALCULATED.3IONS-SCNC: 11.4 MMOL/L (ref 5–15)
AST SERPL-CCNC: 48 U/L (ref 1–40)
BASOPHILS # BLD AUTO: 0.07 10*3/MM3 (ref 0–0.2)
BASOPHILS NFR BLD AUTO: 0.3 % (ref 0–1.5)
BILIRUB SERPL-MCNC: 0.3 MG/DL (ref 0–1.2)
BUN SERPL-MCNC: 15 MG/DL (ref 6–20)
BUN/CREAT SERPL: 16.3 (ref 7–25)
CALCIUM SPEC-SCNC: 8.1 MG/DL (ref 8.6–10.5)
CHLORIDE SERPL-SCNC: 96 MMOL/L (ref 98–107)
CO2 SERPL-SCNC: 27.6 MMOL/L (ref 22–29)
CREAT SERPL-MCNC: 0.92 MG/DL (ref 0.76–1.27)
D-LACTATE SERPL-SCNC: 0.9 MMOL/L (ref 0.5–2)
DEPRECATED RDW RBC AUTO: 44.8 FL (ref 37–54)
EGFRCR SERPLBLD CKD-EPI 2021: 105.8 ML/MIN/1.73
EOSINOPHIL # BLD AUTO: 0.1 10*3/MM3 (ref 0–0.4)
EOSINOPHIL NFR BLD AUTO: 0.5 % (ref 0.3–6.2)
ERYTHROCYTE [DISTWIDTH] IN BLOOD BY AUTOMATED COUNT: 14.9 % (ref 12.3–15.4)
FLUAV SUBTYP SPEC NAA+PROBE: NOT DETECTED
FLUBV RNA ISLT QL NAA+PROBE: NOT DETECTED
GLOBULIN UR ELPH-MCNC: 3.6 GM/DL
GLUCOSE SERPL-MCNC: 109 MG/DL (ref 65–99)
HCT VFR BLD AUTO: 29.3 % (ref 37.5–51)
HGB BLD-MCNC: 10 G/DL (ref 13–17.7)
HOLD SPECIMEN: NORMAL
HOLD SPECIMEN: NORMAL
IMM GRANULOCYTES # BLD AUTO: 0.33 10*3/MM3 (ref 0–0.05)
IMM GRANULOCYTES NFR BLD AUTO: 1.5 % (ref 0–0.5)
LYMPHOCYTES # BLD AUTO: 3.08 10*3/MM3 (ref 0.7–3.1)
LYMPHOCYTES NFR BLD AUTO: 13.9 % (ref 19.6–45.3)
MCH RBC QN AUTO: 28.4 PG (ref 26.6–33)
MCHC RBC AUTO-ENTMCNC: 34.1 G/DL (ref 31.5–35.7)
MCV RBC AUTO: 83.2 FL (ref 79–97)
MONOCYTES # BLD AUTO: 1.46 10*3/MM3 (ref 0.1–0.9)
MONOCYTES NFR BLD AUTO: 6.6 % (ref 5–12)
NEUTROPHILS NFR BLD AUTO: 17.17 10*3/MM3 (ref 1.7–7)
NEUTROPHILS NFR BLD AUTO: 77.2 % (ref 42.7–76)
NRBC BLD AUTO-RTO: 0 /100 WBC (ref 0–0.2)
PLATELET # BLD AUTO: 598 10*3/MM3 (ref 140–450)
PMV BLD AUTO: 10.3 FL (ref 6–12)
POTASSIUM SERPL-SCNC: 3.5 MMOL/L (ref 3.5–5.2)
PROCALCITONIN SERPL-MCNC: 0.34 NG/ML (ref 0–0.25)
PROT SERPL-MCNC: 6.6 G/DL (ref 6–8.5)
RBC # BLD AUTO: 3.52 10*6/MM3 (ref 4.14–5.8)
RSV RNA NPH QL NAA+NON-PROBE: NOT DETECTED
S PYO AG THROAT QL: NEGATIVE
SARS-COV-2 RNA RESP QL NAA+PROBE: NOT DETECTED
SODIUM SERPL-SCNC: 135 MMOL/L (ref 136–145)
WBC NRBC COR # BLD AUTO: 22.21 10*3/MM3 (ref 3.4–10.8)
WHOLE BLOOD HOLD COAG: NORMAL
WHOLE BLOOD HOLD SPECIMEN: NORMAL

## 2024-04-26 PROCEDURE — 87070 CULTURE OTHR SPECIMN AEROBIC: CPT | Performed by: STUDENT IN AN ORGANIZED HEALTH CARE EDUCATION/TRAINING PROGRAM

## 2024-04-26 PROCEDURE — 87899 AGENT NOS ASSAY W/OPTIC: CPT | Performed by: STUDENT IN AN ORGANIZED HEALTH CARE EDUCATION/TRAINING PROGRAM

## 2024-04-26 PROCEDURE — 36415 COLL VENOUS BLD VENIPUNCTURE: CPT

## 2024-04-26 PROCEDURE — 71045 X-RAY EXAM CHEST 1 VIEW: CPT

## 2024-04-26 PROCEDURE — 87880 STREP A ASSAY W/OPTIC: CPT | Performed by: EMERGENCY MEDICINE

## 2024-04-26 PROCEDURE — 80053 COMPREHEN METABOLIC PANEL: CPT | Performed by: EMERGENCY MEDICINE

## 2024-04-26 PROCEDURE — 94799 UNLISTED PULMONARY SVC/PX: CPT

## 2024-04-26 PROCEDURE — 87449 NOS EACH ORGANISM AG IA: CPT | Performed by: STUDENT IN AN ORGANIZED HEALTH CARE EDUCATION/TRAINING PROGRAM

## 2024-04-26 PROCEDURE — 25810000003 SODIUM CHLORIDE 0.9 % SOLUTION: Performed by: STUDENT IN AN ORGANIZED HEALTH CARE EDUCATION/TRAINING PROGRAM

## 2024-04-26 PROCEDURE — 25010000002 LEVOFLOXACIN PER 250 MG: Performed by: EMERGENCY MEDICINE

## 2024-04-26 PROCEDURE — 85652 RBC SED RATE AUTOMATED: CPT

## 2024-04-26 PROCEDURE — 25810000003 SEPSIS FLUID NS 0.9 % SOLUTION: Performed by: EMERGENCY MEDICINE

## 2024-04-26 PROCEDURE — 71250 CT THORAX DX C-: CPT

## 2024-04-26 PROCEDURE — 87641 MR-STAPH DNA AMP PROBE: CPT | Performed by: STUDENT IN AN ORGANIZED HEALTH CARE EDUCATION/TRAINING PROGRAM

## 2024-04-26 PROCEDURE — 84145 PROCALCITONIN (PCT): CPT | Performed by: STUDENT IN AN ORGANIZED HEALTH CARE EDUCATION/TRAINING PROGRAM

## 2024-04-26 PROCEDURE — 82607 VITAMIN B-12: CPT | Performed by: INTERNAL MEDICINE

## 2024-04-26 PROCEDURE — 25010000002 ENOXAPARIN PER 10 MG: Performed by: STUDENT IN AN ORGANIZED HEALTH CARE EDUCATION/TRAINING PROGRAM

## 2024-04-26 PROCEDURE — 87205 SMEAR GRAM STAIN: CPT | Performed by: STUDENT IN AN ORGANIZED HEALTH CARE EDUCATION/TRAINING PROGRAM

## 2024-04-26 PROCEDURE — 87040 BLOOD CULTURE FOR BACTERIA: CPT | Performed by: EMERGENCY MEDICINE

## 2024-04-26 PROCEDURE — 94640 AIRWAY INHALATION TREATMENT: CPT

## 2024-04-26 PROCEDURE — 25010000002 VANCOMYCIN 5 G RECONSTITUTED SOLUTION: Performed by: STUDENT IN AN ORGANIZED HEALTH CARE EDUCATION/TRAINING PROGRAM

## 2024-04-26 PROCEDURE — 99291 CRITICAL CARE FIRST HOUR: CPT

## 2024-04-26 PROCEDURE — 87637 SARSCOV2&INF A&B&RSV AMP PRB: CPT | Performed by: EMERGENCY MEDICINE

## 2024-04-26 PROCEDURE — 87081 CULTURE SCREEN ONLY: CPT | Performed by: EMERGENCY MEDICINE

## 2024-04-26 PROCEDURE — 85025 COMPLETE CBC W/AUTO DIFF WBC: CPT | Performed by: EMERGENCY MEDICINE

## 2024-04-26 PROCEDURE — 83605 ASSAY OF LACTIC ACID: CPT | Performed by: EMERGENCY MEDICINE

## 2024-04-26 RX ORDER — ENOXAPARIN SODIUM 100 MG/ML
30 INJECTION SUBCUTANEOUS EVERY 24 HOURS
Status: DISCONTINUED | OUTPATIENT
Start: 2024-04-26 | End: 2024-05-03 | Stop reason: HOSPADM

## 2024-04-26 RX ORDER — PREGABALIN 75 MG/1
150 CAPSULE ORAL 3 TIMES DAILY
Status: DISCONTINUED | OUTPATIENT
Start: 2024-04-26 | End: 2024-05-03 | Stop reason: HOSPADM

## 2024-04-26 RX ORDER — IPRATROPIUM BROMIDE AND ALBUTEROL SULFATE 2.5; .5 MG/3ML; MG/3ML
3 SOLUTION RESPIRATORY (INHALATION)
Status: DISCONTINUED | OUTPATIENT
Start: 2024-04-26 | End: 2024-05-01

## 2024-04-26 RX ORDER — BISACODYL 10 MG
10 SUPPOSITORY, RECTAL RECTAL DAILY PRN
Status: DISCONTINUED | OUTPATIENT
Start: 2024-04-26 | End: 2024-05-03 | Stop reason: HOSPADM

## 2024-04-26 RX ORDER — OXCARBAZEPINE 300 MG/1
600 TABLET, FILM COATED ORAL 3 TIMES DAILY
Status: DISCONTINUED | OUTPATIENT
Start: 2024-04-26 | End: 2024-05-03 | Stop reason: HOSPADM

## 2024-04-26 RX ORDER — LEVOFLOXACIN 5 MG/ML
750 INJECTION, SOLUTION INTRAVENOUS EVERY 24 HOURS
Qty: 900 ML | Refills: 0 | Status: DISCONTINUED | OUTPATIENT
Start: 2024-04-27 | End: 2024-04-27

## 2024-04-26 RX ORDER — LEVOFLOXACIN 5 MG/ML
750 INJECTION, SOLUTION INTRAVENOUS ONCE
Status: COMPLETED | OUTPATIENT
Start: 2024-04-26 | End: 2024-04-26

## 2024-04-26 RX ORDER — ALPRAZOLAM 0.25 MG/1
0.5 TABLET ORAL EVERY 8 HOURS PRN
Status: DISCONTINUED | OUTPATIENT
Start: 2024-04-26 | End: 2024-05-03 | Stop reason: HOSPADM

## 2024-04-26 RX ORDER — PREGABALIN 150 MG/1
1 CAPSULE ORAL 3 TIMES DAILY
COMMUNITY
Start: 2024-04-25

## 2024-04-26 RX ORDER — ALUMINA, MAGNESIA, AND SIMETHICONE 2400; 2400; 240 MG/30ML; MG/30ML; MG/30ML
15 SUSPENSION ORAL EVERY 6 HOURS PRN
Status: DISCONTINUED | OUTPATIENT
Start: 2024-04-26 | End: 2024-05-03 | Stop reason: HOSPADM

## 2024-04-26 RX ORDER — ONDANSETRON 2 MG/ML
4 INJECTION INTRAMUSCULAR; INTRAVENOUS EVERY 6 HOURS PRN
Status: DISCONTINUED | OUTPATIENT
Start: 2024-04-26 | End: 2024-05-03 | Stop reason: HOSPADM

## 2024-04-26 RX ORDER — SODIUM CHLORIDE 0.9 % (FLUSH) 0.9 %
10 SYRINGE (ML) INJECTION AS NEEDED
Status: DISCONTINUED | OUTPATIENT
Start: 2024-04-26 | End: 2024-05-03 | Stop reason: HOSPADM

## 2024-04-26 RX ORDER — QUETIAPINE FUMARATE 300 MG/1
300 TABLET, FILM COATED ORAL
COMMUNITY
Start: 2024-04-16

## 2024-04-26 RX ORDER — POLYETHYLENE GLYCOL 3350 17 G/17G
17 POWDER, FOR SOLUTION ORAL DAILY PRN
Status: DISCONTINUED | OUTPATIENT
Start: 2024-04-26 | End: 2024-05-03 | Stop reason: HOSPADM

## 2024-04-26 RX ORDER — QUETIAPINE FUMARATE 100 MG/1
300 TABLET, FILM COATED ORAL NIGHTLY
Status: DISCONTINUED | OUTPATIENT
Start: 2024-04-26 | End: 2024-05-03 | Stop reason: HOSPADM

## 2024-04-26 RX ORDER — BISACODYL 5 MG/1
5 TABLET, DELAYED RELEASE ORAL DAILY PRN
Status: DISCONTINUED | OUTPATIENT
Start: 2024-04-26 | End: 2024-05-03 | Stop reason: HOSPADM

## 2024-04-26 RX ORDER — IBUPROFEN 400 MG/1
800 TABLET ORAL ONCE
Status: COMPLETED | OUTPATIENT
Start: 2024-04-26 | End: 2024-04-26

## 2024-04-26 RX ORDER — NITROGLYCERIN 0.4 MG/1
0.4 TABLET SUBLINGUAL
Status: DISCONTINUED | OUTPATIENT
Start: 2024-04-26 | End: 2024-05-03 | Stop reason: HOSPADM

## 2024-04-26 RX ORDER — AMOXICILLIN 250 MG
2 CAPSULE ORAL 2 TIMES DAILY PRN
Status: DISCONTINUED | OUTPATIENT
Start: 2024-04-26 | End: 2024-05-03 | Stop reason: HOSPADM

## 2024-04-26 RX ORDER — ACETAMINOPHEN 650 MG/1
650 SUPPOSITORY RECTAL EVERY 4 HOURS PRN
Status: DISCONTINUED | OUTPATIENT
Start: 2024-04-26 | End: 2024-05-03 | Stop reason: HOSPADM

## 2024-04-26 RX ORDER — BUPRENORPHINE HYDROCHLORIDE AND NALOXONE HYDROCHLORIDE DIHYDRATE 8; 2 MG/1; MG/1
1 TABLET SUBLINGUAL DAILY
Status: DISCONTINUED | OUTPATIENT
Start: 2024-04-26 | End: 2024-05-03 | Stop reason: HOSPADM

## 2024-04-26 RX ORDER — OXCARBAZEPINE 600 MG/1
1 TABLET, FILM COATED ORAL 3 TIMES DAILY
COMMUNITY
Start: 2024-04-25

## 2024-04-26 RX ORDER — ACETAMINOPHEN 325 MG/1
650 TABLET ORAL EVERY 4 HOURS PRN
Status: DISCONTINUED | OUTPATIENT
Start: 2024-04-26 | End: 2024-05-03 | Stop reason: HOSPADM

## 2024-04-26 RX ORDER — FAMOTIDINE 20 MG/1
40 TABLET, FILM COATED ORAL DAILY
Status: DISCONTINUED | OUTPATIENT
Start: 2024-04-26 | End: 2024-05-03 | Stop reason: HOSPADM

## 2024-04-26 RX ORDER — CHOLECALCIFEROL (VITAMIN D3) 125 MCG
5 CAPSULE ORAL NIGHTLY PRN
Status: DISCONTINUED | OUTPATIENT
Start: 2024-04-26 | End: 2024-05-03 | Stop reason: HOSPADM

## 2024-04-26 RX ORDER — ACETAMINOPHEN 160 MG/5ML
650 SOLUTION ORAL EVERY 4 HOURS PRN
Status: DISCONTINUED | OUTPATIENT
Start: 2024-04-26 | End: 2024-05-03 | Stop reason: HOSPADM

## 2024-04-26 RX ADMIN — BUPRENORPHINE AND NALOXONE 1 TABLET: 8; 2 TABLET SUBLINGUAL at 20:22

## 2024-04-26 RX ADMIN — SODIUM CHLORIDE 2739 ML: 9 INJECTION, SOLUTION INTRAVENOUS at 18:01

## 2024-04-26 RX ADMIN — PREGABALIN 150 MG: 25 CAPSULE ORAL at 20:22

## 2024-04-26 RX ADMIN — LEVOFLOXACIN 750 MG: 5 INJECTION, SOLUTION INTRAVENOUS at 18:19

## 2024-04-26 RX ADMIN — IBUPROFEN 800 MG: 400 TABLET, FILM COATED ORAL at 17:08

## 2024-04-26 RX ADMIN — FAMOTIDINE 40 MG: 20 TABLET ORAL at 20:22

## 2024-04-26 RX ADMIN — QUETIAPINE FUMARATE 300 MG: 200 TABLET ORAL at 20:22

## 2024-04-26 RX ADMIN — VANCOMYCIN HYDROCHLORIDE 1750 MG: 5 INJECTION, POWDER, LYOPHILIZED, FOR SOLUTION INTRAVENOUS at 23:52

## 2024-04-26 RX ADMIN — OXCARBAZEPINE 600 MG: 300 TABLET, FILM COATED ORAL at 20:22

## 2024-04-26 RX ADMIN — IPRATROPIUM BROMIDE AND ALBUTEROL SULFATE 3 ML: .5; 3 SOLUTION RESPIRATORY (INHALATION) at 19:54

## 2024-04-26 RX ADMIN — ENOXAPARIN SODIUM 30 MG: 100 INJECTION SUBCUTANEOUS at 20:21

## 2024-04-26 NOTE — H&P
[facility]   HISTORY AND PHYSICAL    Patient Name: Rhett Aguirre  : 1980  MRN: [id number]  Primary Care Physician:  Chris Potts MD  Date of admission: 2024    Subjective   Subjective     Chief Complaint: Shortness of breath    HPI:    Rhett Aguirre is a 43 y.o. male 43-year-old male chronically on Suboxone, bipolar disorder came in due to cough sore throat nausea and vomiting associated with fever.  The symptoms have been ongoing for about a week and he was given a Z-Richie in outpatient setting.  His COVID flu and RSV were all negative.  Due to his worsening symptoms associated with pleuritic chest pain patient presented to the ER    In the ER patient's temperature was noted to be 101.  His blood pressures and vitals were otherwise stable.  His chest x-ray showed a left lower lobe consolidation concerning for pneumonia.  He was also noted to have a small pleural effusion.  Subsequently chest CT which has not yet been read showed concerns for cavitation with air-fluid bronchograms consistent with dense pneumonia and mild pleural effusion.  Patient admitted for further management of pneumonia    Review of Systems  All review of systems negative except as in subjective complaints:  Personal History     Past Medical History:   Diagnosis Date    Arthritis     Depression     Hyperlipidemia     Hypertension     Opioid abuse     Substance abuse     Tobacco abuse        Past Surgical History:   Procedure Laterality Date    BACK SURGERY         Family History: family history is not on file. Otherwise pertinent FHx was reviewed and not pertinent to current issue.    Social History:  reports that he has been smoking cigarettes. He has a 20 pack-year smoking history. He has never been exposed to tobacco smoke. He has never used smokeless tobacco. He reports that he does not currently use drugs. He reports that he does not drink alcohol.    Home Medications:  OXcarbazepine, QUEtiapine,  azithromycin, buprenorphine-naloxone, cloNIDine, omeprazole, ondansetron ODT, and pregabalin      Allergies:  Allergies   Allergen Reactions    Penicillins Rash and Swelling    Sulfa Antibiotics Swelling and Rash    Sulfamethoxazole-Trimethoprim Swelling, Unknown - High Severity and Rash    Cefdinir Hives     Unknown reaction    Sulfadiazine Unknown - High Severity    Amoxicillin Unknown - High Severity, Swelling and Rash    Bee Venom Rash and Hives    Cephalexin Rash, Unknown - High Severity and Hives     Unknown reaction    Sulfacetamide-Sulfur In Urea Rash       Objective   Objective     Vitals:   Temp:  [99 °F (37.2 °C)-101 °F (38.3 °C)] 99 °F (37.2 °C)  Heart Rate:  [] 91  Resp:  [18-20] 18  BP: (130-142)/(68-80) 132/78  Physical Exam               Constitutional:         Awake, alert responsive, conversant, no obvious distress   Eyes:                       PERRLA, sclerae anicteric, no conjunctival injection   HEENT:                   Moist mucous membranes, no nasal or eye discharge, no throat congestion   Neck:                      Supple, no thyromegaly, no lymphadenopathy, trachea midline, no elevated JVD   Respiratory:           Clear to auscultation bilaterally, nonlabored respirations    Cardiovascular:     RRR, no murmurs, rubs, or gallops, palpable pedal pulses bilaterally,No bilateral ankle edema   Gastrointestinal:   Positive bowel sounds, soft, nontender, nondistended, no organomegaly   Musculoskeletal:   No clubbing or cyanosis to extremities, muscle wasting, joint swelling, muscle weakness   Psychiatric:             Appropriate affect, cooperative   Neurologic:            Awake alert ,oriented x 3, strength symmetric in all extremities, Cranial Nerves grossly intact to confrontation, speech clear   Skin:                      No rashes, bruising, skin ulcers, petechiae or ecchymosis    Result Review    Result Review:  I have personally reviewed the results from the time of this admission to  4/26/2024 19:13 EDT and agree with these findings:  []  Laboratory  []  Microbiology  []  Radiology  []  EKG/Telemetry   []  Cardiology/Vascular   []  Pathology  []  Old records  []  Other:    Results from last 7 days   Lab Units 04/26/24  1701   WBC 10*3/mm3 22.21*   HEMOGLOBIN g/dL 10.0*   PLATELETS 10*3/mm3 598*     Results from last 7 days   Lab Units 04/26/24  1701   SODIUM mmol/L 135*   POTASSIUM mmol/L 3.5   CHLORIDE mmol/L 96*   CO2 mmol/L 27.6   ANION GAP mmol/L 11.4   BUN mg/dL 15   CREATININE mg/dL 0.92   GLUCOSE mg/dL 109*         Assessment & Plan   Assessment / Plan     Active Hospital Problems:  Active Hospital Problems    Diagnosis     **Pneumonia      43-year-old male chronically on Suboxone, bipolar disorder came in due to cough sore throat nausea and vomiting associated with fever in background of being treated with Z-Richie noted to have high-grade fever upon admission and x-ray showing left lower lobe consolidation and on CT scan showing dense consolidation with necrosis with mild pleural effusion.    Plan:   Urine strep and Legionella ordered  Pro-Deniz ordered  Sputum cultures  Due to patient's allergies patient has been started on Levaquin IV and vanc. MRSA pending. Patient will need long course antibiotics  DuoNebs scheduled  Continue Suboxone at home dose  Continue Triptil, Lyrica and Seroquel at home doses  Will need pulm consult in a.m as patient will need bronch    DVT prophylaxis:  Medical DVT prophylaxis orders are signed and held.          CODE STATUS:    Code Status (Patient has no pulse and is not breathing): CPR (Attempt to Resuscitate)  Medical Interventions (Patient has pulse or is breathing): Full Support    Admission Status:  I believe this patient meets inpatient status.    Electronically signed by Yasmine Hdz MD, 04/26/24, 7:13 PM EDT.            no concerns

## 2024-04-26 NOTE — ED PROVIDER NOTES
Time: 5:37 PM EDT  Date of encounter:  4/26/2024  Independent Historian/Clinical History and Information was obtained by:   Patient    History is limited by: N/A    Chief Complaint: Cough, nausea and vomiting, fever      History of Present Illness:  Patient is a 43 y.o. year old male who presents to the emergency department for evaluation of cough, sore throat, nausea and vomiting, weakness and fever, starting last week.    He arrives with fever of 101 Fahrenheit today.    He has already been swabbed for COVID and flu and RSV virus and strep throat and it is all been negative at the urgent care clinic earlier in the week and he was started prophylactically on Z-Richie antibiotic.    He reports he is not getting any better.  And coughing up green sputum and having some pleuritic pain in the left side of the rib cage.  He feels very thirsty or dehydrated despite drinking Pedialyte.    HPI    Patient Care Team  Primary Care Provider: Chris Potts MD    Past Medical History:     Allergies   Allergen Reactions    Penicillins Rash and Swelling    Sulfa Antibiotics Swelling and Rash    Sulfamethoxazole-Trimethoprim Swelling, Unknown - High Severity and Rash    Cefdinir Hives     Unknown reaction    Sulfadiazine Unknown - High Severity    Amoxicillin Unknown - High Severity, Swelling and Rash    Bee Venom Rash and Hives    Cephalexin Rash, Unknown - High Severity and Hives     Unknown reaction    Sulfacetamide-Sulfur In Urea Rash     Past Medical History:   Diagnosis Date    Arthritis     Depression     Hyperlipidemia     Hypertension     Opioid abuse     Substance abuse     Tobacco abuse      Past Surgical History:   Procedure Laterality Date    BACK SURGERY       History reviewed. No pertinent family history.    Home Medications:  Prior to Admission medications    Medication Sig Start Date End Date Taking? Authorizing Provider   azithromycin (Zithromax Z-Richie) 250 MG tablet Take 2 tablets by mouth on day 1, then 1  tablet daily on days 2-5 4/22/24   Yudi Gore APRN   buprenorphine-naloxone (SUBOXONE) 8-2 MG film film PLACE & DISSOLVE 2 FILMS UNDER THE TONGUE ONCE DAILY 9/2/23   Glen Wei MD   cloNIDine (CATAPRES) 0.3 MG tablet TAKE ONE TABLET BY MOUTH UP TO THREE TIMES DAILY AS NEEDED FOR ANXIETY 1/31/23   Glen Wei MD   EPINEPHrine (EPIPEN) 0.3 MG/0.3ML solution auto-injector injection See Admin Instructions.    Emergency, Nurse Adriano RN   fenofibrate (TRICOR) 145 MG tablet Take 1 tablet by mouth Daily. 11/8/22   Glen Wei MD   furosemide (LASIX) 20 MG tablet Take 1 tablet by mouth Daily. 11/8/22   Glen Wei MD   gabapentin (NEURONTIN) 600 MG tablet Take 1 tablet by mouth 3 (Three) Times a Day. 6/5/23   Glen Wei MD   hydroCHLOROthiazide (HYDRODIURIL) 25 MG tablet Take 1 tablet by mouth Daily. 9/3/22   Virginia Kay APRN   hydrOXYzine pamoate (VISTARIL) 100 MG capsule Take 1 capsule by mouth three times a day as needed for anxiety/insomnia. 9/1/23   Glen Wei MD   ibuprofen (ADVIL,MOTRIN) 800 MG tablet Take 1 tablet by mouth Every 6 (Six) Hours As Needed for Mild Pain. 6/26/23   Krish Barth APRN   metoprolol succinate XL (TOPROL-XL) 50 MG 24 hr tablet Take 1 tablet by mouth Daily. 11/9/22   Glen Wei MD   mupirocin (BACTROBAN) 2 % ointment Apply 1 application topically to the appropriate area as directed 3 (Three) Times a Day. 7/1/23   Jagdish Abebe,    omeprazole (priLOSEC) 20 MG capsule Prilosec 20 mg oral capsule,delayed release(DR/EC) take 1 capsule (20 mg) by oral route once daily before a meal   Suspended    Emergency, Nurse Adriano RN   ondansetron ODT (ZOFRAN-ODT) 4 MG disintegrating tablet Place 1 tablet on the tongue Every 8 (Eight) Hours As Needed for Vomiting or Nausea. 4/22/24   Yudi Gore APRN   pantoprazole (PROTONIX) 40 MG EC tablet     Provider, MD Glen   QUEtiapine (SEROquel)  "200 MG tablet  9/11/23   Glen Wei MD   sildenafil (VIAGRA) 50 MG tablet Take 1 tablet by mouth Daily. 5/22/23   Glen Wei MD   venlafaxine XR (EFFEXOR-XR) 75 MG 24 hr capsule Take 1 capsule by mouth Daily. 9/1/23   Glen Wei MD        Social History:   Social History     Tobacco Use    Smoking status: Every Day     Current packs/day: 1.00     Average packs/day: 1 pack/day for 20.0 years (20.0 ttl pk-yrs)     Types: Cigarettes     Passive exposure: Never    Smokeless tobacco: Never   Vaping Use    Vaping status: Never Used   Substance Use Topics    Alcohol use: Never    Drug use: Not Currently     Comment: h/o heroine opiod dependency         Review of Systems:  Review of Systems   I performed a 10 point review of systems which was all negative, except for the positives found in the HPI above.  Physical Exam:  /68 (BP Location: Left arm, Patient Position: Lying)   Pulse 98   Temp (!) 101 °F (38.3 °C) (Oral)   Resp 18   Ht 180.3 cm (71\")   Wt 91.3 kg (201 lb 4.5 oz)   SpO2 91%   BMI 28.07 kg/m²     Physical Exam   General: Awake alert and in mild distress, looks to be feeling unwell, febrile    HEENT: Head normocephalic atraumatic, eyes PERRLA EOMI, nose normal, oropharynx normal.  Mucous membranes look dry, dehydrated    Neck: Supple full range of motion, no meningismus, no lymphadenopathy    Heart: Tachycardic with a regular rhythm, no murmurs or rubs, 2+ radial pulses bilaterally    Lungs: Decreased breath sounds in the left lung base, mild tachypnea,, no respiratory distress    Abdomen: Soft, nontender, nondistended, no rebound or guarding    Skin: Warm, dry, no rash    Musculoskeletal: Normal range of motion, no lower extremity edema    Neurologic: Oriented x3, no motor deficits no sensory deficits    Psychiatric: Mood appears stable, no psychosis          Procedures:  Procedures      Medical Decision Making:      Comorbidities that affect care:    Smoking, " Substance Abuse    External Notes reviewed:    Previous Labs: I compared today's lab work including elevated white blood cell count with his previous labs and it looks like he has a chronically elevated white blood cell count but it looks worse today and with a left shift now.      The following orders were placed and all results were independently analyzed by me:  Orders Placed This Encounter   Procedures    COVID-19, FLU A/B, RSV PCR 1 HR TAT - Swab, Nasopharynx    Rapid Strep A Screen - Swab, Throat    Blood Culture - Blood,    Blood Culture - Blood,    Beta Strep Culture, Throat - Swab, Throat    XR Chest 1 View    CT Chest Without Contrast Diagnostic    Comprehensive Metabolic Panel    Lactic Acid, Plasma    [place] Draw    CBC Auto Differential    Undress & Gown    Continuous Pulse Oximetry    Vital Signs    Nephrology  (on-call MD unless specified)    Oxygen Therapy- Nasal Cannula; Titrate 1-6 LPM Per SpO2; 90 - 95%    Insert Peripheral IV    Insert Peripheral IV    Inpatient Admission    CBC & Differential    Green Top (Gel)    Lavender Top    Gold Top - SST    Light Blue Top       Medications Given in the Emergency Department:  Medications   sodium chloride 0.9 % flush 10 mL (has no administration in time range)   sodium chloride 0.9 % flush 10 mL (has no administration in time range)   sepsis fluid NS 0.9 % bolus 2,739 mL (2,739 mL Intravenous New Bag 4/26/24 1801)   levoFLOXacin (LEVAQUIN) 750 mg/150 mL D5W (premix) (LEVAQUIN) 750 mg (750 mg Intravenous New Bag 4/26/24 1819)   ibuprofen (ADVIL,MOTRIN) tablet 800 mg (800 mg Oral Given 4/26/24 1708)        ED Course:         Labs:    Lab Results (last 24 hours)       Procedure Component Value Units Date/Time    COVID-19, FLU A/B, RSV PCR 1 HR TAT - Swab, Nasopharynx [[id number]]  (Normal) Collected: 04/26/24 1635    Specimen: Swab from Nasopharynx Updated: 04/26/24 1717     COVID19 Not Detected     Influenza A PCR Not Detected     Influenza B PCR Not  Detected     RSV, PCR Not Detected    Narrative:      Fact sheet for providers: https://www.fda.gov/media/799379/download    Fact sheet for patients: https://www.fda.gov/media/531783/download    Test performed by PCR.    Rapid Strep A Screen - Swab, Throat [[id number]]  (Normal) Collected: 04/26/24 1635    Specimen: Swab from Throat Updated: 04/26/24 1651     Strep A Ag Negative    Beta Strep Culture, Throat - Swab, Throat [[id number]] Collected: 04/26/24 1635    Specimen: Swab from Throat Updated: 04/26/24 1651    CBC & Differential [[id number]]  (Abnormal) Collected: 04/26/24 1701    Specimen: Blood Updated: 04/26/24 1711    Narrative:      The following orders were created for panel order CBC & Differential.  Procedure                               Abnormality         Status                     ---------                               -----------         ------                     CBC Auto Differential[[id number]]        Abnormal            Final result                 Please view results for these tests on the individual orders.    Comprehensive Metabolic Panel [[id number]]  (Abnormal) Collected: 04/26/24 1701    Specimen: Blood Updated: 04/26/24 1728     Glucose 109 mg/dL      BUN 15 mg/dL      Creatinine 0.92 mg/dL      Sodium 135 mmol/L      Potassium 3.5 mmol/L      Chloride 96 mmol/L      CO2 27.6 mmol/L      Calcium 8.1 mg/dL      Total Protein 6.6 g/dL      Albumin 3.0 g/dL      ALT (SGPT) 36 U/L      AST (SGOT) 48 U/L      Alkaline Phosphatase 130 U/L      Total Bilirubin 0.3 mg/dL      Globulin 3.6 gm/dL      A/G Ratio 0.8 g/dL      BUN/Creatinine Ratio 16.3     Anion Gap 11.4 mmol/L      eGFR 105.8 mL/min/1.73     Narrative:      GFR Normal >60  Chronic Kidney Disease <60  Kidney Failure <15      Lactic Acid, Plasma [[id number]]  (Normal) Collected: 04/26/24 1701    Specimen: Blood Updated: 04/26/24 1724     Lactate 0.9 mmol/L     Blood Culture - Blood, Arm, Left [[id number]] Collected: 04/26/24 1701     Specimen: Blood from Arm, Left Updated: 04/26/24 1711    Blood Culture - Blood, Arm, Right [[id number]] Collected: 04/26/24 1701    Specimen: Blood from Arm, Right Updated: 04/26/24 1710    CBC Auto Differential [[id number]]  (Abnormal) Collected: 04/26/24 1701    Specimen: Blood Updated: 04/26/24 1711     WBC 22.21 10*3/mm3      RBC 3.52 10*6/mm3      Hemoglobin 10.0 g/dL      Hematocrit 29.3 %      MCV 83.2 fL      MCH 28.4 pg      MCHC 34.1 g/dL      RDW 14.9 %      RDW-SD 44.8 fl      MPV 10.3 fL      Platelets 598 10*3/mm3      Neutrophil % 77.2 %      Lymphocyte % 13.9 %      Monocyte % 6.6 %      Eosinophil % 0.5 %      Basophil % 0.3 %      Immature Grans % 1.5 %      Neutrophils, Absolute 17.17 10*3/mm3      Lymphocytes, Absolute 3.08 10*3/mm3      Monocytes, Absolute 1.46 10*3/mm3      Eosinophils, Absolute 0.10 10*3/mm3      Basophils, Absolute 0.07 10*3/mm3      Immature Grans, Absolute 0.33 10*3/mm3      nRBC 0.0 /100 WBC              Imaging:    XR Chest 1 View    Result Date: 4/26/2024  XR CHEST 1 VW-  Date of Exam: 4/26/2024 4:40 PM  Indication: cough for over a week.  Comparison Exams: June 14, 2023  Technique: Single AP chest radiograph  FINDINGS: There is a consolidation within the left lower lung with small left pleural effusion. The heart and mediastinal contours appear normal. The pulmonary vasculature appears normal. The osseous structures appear intact.      1.  Consolidation within left lower lobe, concerning for pneumonia or aspiration. 2.  Small left pleural effusion.   Electronically Signed By-Vince Severino MD On:4/26/2024 4:49 PM         Differential Diagnosis and Discussion:    Cough: Differential diagnosis includes but is not limited to pneumonia, acute bronchitis, upper respiratory infection, ACE inhibitor use, allergic reaction, epiglottitis, seasonal allergies, chemical irritants, exercise-induced asthma, viral syndrome.  Fever: Based on the complaint of fever, differential  diagnosis includes but is not limited to meningitis, pneumonia, pyelonephritis, acute uti,  systemic immune response syndrome, sepsis, viral syndrome, fungal infection, tick born illness and other bacterial infections.    All labs were reviewed and interpreted by me.  All X-rays impressions were independently interpreted by me.    MDM     Amount and/or Complexity of Data Reviewed  Clinical lab tests: reviewed  Decide to obtain previous medical records or to obtain history from someone other than the patient: yes           This patient is a 43-year-old male with previous history of IV drug use now clean for 2 years, is now presenting with productive cough with greenish sputum and weakness and some nausea and vomiting and is noted to be febrile on arrival to 101 °F and tachycardic and tachypneic meeting SIRS criteria.    I reviewed the image of his chest x-ray showing a left lower lobe consolidation and pleural effusion, concerning for pneumonia.        **It looks like he meets sepsis criteria and I recognized this at 5:40 PM and starting IV broad-spectrum antibiotics and IV fluids and sending off blood cultures and lactic acid today.**      I will plan to admit him to the hospital for further management of community-acquired pneumonia.      Critical Care Note: Total Critical Care time of 35 minutes. Total critical care time documented does not include time spent on separately billed procedures for services of nurses or physician assistants. I personally saw and examined the patient. I have reviewed all diagnostic interpretations and treatment plans as written. I was present for the key portions of any procedures performed and the inclusive time noted in any critical care statement. Critical care time includes patient management by me, time spent at the patients bedside,  time to review lab and imaging results, discussing patient care, documentation in the medical record, and time spent with family or  caregiver.        Patient Care Considerations:          Consultants/Shared Management Plan:    PCP: I have discussed the case with his on-call PCP who agrees to accept the patient for admission.    Social Determinants of Health:    Patient is independent, reliable, and has access to care.       Disposition and Care Coordination:    Admit:   Through independent evaluation of the patient's history, physical, and imperical data, the patient meets criteria for inpatient admission to the hospital.        Final diagnoses:   Pneumonia of left lower lobe due to infectious organism   Sepsis without acute organ dysfunction, due to unspecified organism   Acute febrile illness        ED Disposition       ED Disposition   Decision to Admit    Condition   --    Comment   Level of Care: Telemetry [5]   Diagnosis: Pneumonia [098033]   Admitting Physician: KARO HOUSER [[id number]]   Attending Physician: KARO HOUSER [[id number]]   Isolate for COVID?: No [0]   Certification: I Certify That Inpatient Hospital Services Are Medically Necessary For Greater Than 2 Midnights                 This medical record created using voice recognition software.             Girma Connors MD  04/26/24 [id number]       Girma Connors MD  04/26/24 [id number]

## 2024-04-27 ENCOUNTER — APPOINTMENT (OUTPATIENT)
Dept: CARDIOLOGY | Facility: HOSPITAL | Age: 44
DRG: 193 | End: 2024-04-27
Payer: COMMERCIAL

## 2024-04-27 LAB
ANION GAP SERPL CALCULATED.3IONS-SCNC: 11 MMOL/L (ref 5–15)
BUN SERPL-MCNC: 9 MG/DL (ref 6–20)
BUN/CREAT SERPL: 14.3 (ref 7–25)
CALCIUM SPEC-SCNC: 7.9 MG/DL (ref 8.6–10.5)
CHLORIDE SERPL-SCNC: 104 MMOL/L (ref 98–107)
CO2 SERPL-SCNC: 25 MMOL/L (ref 22–29)
CREAT SERPL-MCNC: 0.63 MG/DL (ref 0.76–1.27)
CRP SERPL-MCNC: 26.09 MG/DL (ref 0–0.5)
EGFRCR SERPLBLD CKD-EPI 2021: 121 ML/MIN/1.73
ERYTHROCYTE [SEDIMENTATION RATE] IN BLOOD: >130 MM/HR (ref 0–15)
GLUCOSE SERPL-MCNC: 89 MG/DL (ref 65–99)
HIV 1+2 AB+HIV1P24 AG SERPLBLD IA.RAPID: NORMAL
HIV 1+2 AB+HIV1P24 AG SERPLBLD IA.RAPID: NORMAL
L PNEUMO1 AG UR QL IA: NEGATIVE
MRSA DNA SPEC QL NAA+PROBE: ABNORMAL
NT-PROBNP SERPL-MCNC: 423.6 PG/ML (ref 0–450)
POTASSIUM SERPL-SCNC: 3.4 MMOL/L (ref 3.5–5.2)
S PNEUM AG SPEC QL LA: NEGATIVE
SODIUM SERPL-SCNC: 140 MMOL/L (ref 136–145)

## 2024-04-27 PROCEDURE — 93306 TTE W/DOPPLER COMPLETE: CPT | Performed by: INTERNAL MEDICINE

## 2024-04-27 PROCEDURE — 86480 TB TEST CELL IMMUN MEASURE: CPT

## 2024-04-27 PROCEDURE — 93306 TTE W/DOPPLER COMPLETE: CPT

## 2024-04-27 PROCEDURE — 94760 N-INVAS EAR/PLS OXIMETRY 1: CPT

## 2024-04-27 PROCEDURE — 94664 DEMO&/EVAL PT USE INHALER: CPT

## 2024-04-27 PROCEDURE — 25810000003 SODIUM CHLORIDE 0.9 % SOLUTION: Performed by: STUDENT IN AN ORGANIZED HEALTH CARE EDUCATION/TRAINING PROGRAM

## 2024-04-27 PROCEDURE — 87449 NOS EACH ORGANISM AG IA: CPT

## 2024-04-27 PROCEDURE — 83880 ASSAY OF NATRIURETIC PEPTIDE: CPT

## 2024-04-27 PROCEDURE — 25010000002 VANCOMYCIN 5 G RECONSTITUTED SOLUTION: Performed by: STUDENT IN AN ORGANIZED HEALTH CARE EDUCATION/TRAINING PROGRAM

## 2024-04-27 PROCEDURE — 94799 UNLISTED PULMONARY SVC/PX: CPT

## 2024-04-27 PROCEDURE — 99223 1ST HOSP IP/OBS HIGH 75: CPT | Performed by: INTERNAL MEDICINE

## 2024-04-27 PROCEDURE — 25010000002 ENOXAPARIN PER 10 MG: Performed by: STUDENT IN AN ORGANIZED HEALTH CARE EDUCATION/TRAINING PROGRAM

## 2024-04-27 PROCEDURE — 25010000002 LEVOFLOXACIN PER 250 MG: Performed by: INTERNAL MEDICINE

## 2024-04-27 PROCEDURE — 25010000002 ONDANSETRON PER 1 MG: Performed by: STUDENT IN AN ORGANIZED HEALTH CARE EDUCATION/TRAINING PROGRAM

## 2024-04-27 PROCEDURE — 25010000002 METRONIDAZOLE 500 MG/100ML SOLUTION: Performed by: INTERNAL MEDICINE

## 2024-04-27 PROCEDURE — 87385 HISTOPLASMA CAPSUL AG IA: CPT

## 2024-04-27 PROCEDURE — 80048 BASIC METABOLIC PNL TOTAL CA: CPT | Performed by: STUDENT IN AN ORGANIZED HEALTH CARE EDUCATION/TRAINING PROGRAM

## 2024-04-27 PROCEDURE — 86140 C-REACTIVE PROTEIN: CPT

## 2024-04-27 PROCEDURE — 25010000002 CALCIUM GLUCONATE-NACL 1-0.675 GM/50ML-% SOLUTION: Performed by: INTERNAL MEDICINE

## 2024-04-27 PROCEDURE — G0475 HIV COMBINATION ASSAY: HCPCS | Performed by: STUDENT IN AN ORGANIZED HEALTH CARE EDUCATION/TRAINING PROGRAM

## 2024-04-27 RX ORDER — LEVOFLOXACIN 5 MG/ML
750 INJECTION, SOLUTION INTRAVENOUS EVERY 24 HOURS
Status: DISCONTINUED | OUTPATIENT
Start: 2024-04-27 | End: 2024-05-03 | Stop reason: HOSPADM

## 2024-04-27 RX ORDER — METRONIDAZOLE 500 MG/100ML
500 INJECTION, SOLUTION INTRAVENOUS EVERY 8 HOURS
Status: DISCONTINUED | OUTPATIENT
Start: 2024-04-27 | End: 2024-05-03

## 2024-04-27 RX ORDER — POTASSIUM CHLORIDE 750 MG/1
40 CAPSULE, EXTENDED RELEASE ORAL ONCE
Status: COMPLETED | OUTPATIENT
Start: 2024-04-27 | End: 2024-04-27

## 2024-04-27 RX ORDER — CALCIUM GLUCONATE 20 MG/ML
1000 INJECTION, SOLUTION INTRAVENOUS ONCE
Status: COMPLETED | OUTPATIENT
Start: 2024-04-27 | End: 2024-04-27

## 2024-04-27 RX ADMIN — OXCARBAZEPINE 600 MG: 300 TABLET, FILM COATED ORAL at 16:22

## 2024-04-27 RX ADMIN — IPRATROPIUM BROMIDE AND ALBUTEROL SULFATE 3 ML: .5; 3 SOLUTION RESPIRATORY (INHALATION) at 20:00

## 2024-04-27 RX ADMIN — OXCARBAZEPINE 600 MG: 300 TABLET, FILM COATED ORAL at 08:53

## 2024-04-27 RX ADMIN — QUETIAPINE FUMARATE 300 MG: 200 TABLET ORAL at 20:53

## 2024-04-27 RX ADMIN — Medication 10 ML: at 08:53

## 2024-04-27 RX ADMIN — ACETAMINOPHEN 650 MG: 325 TABLET ORAL at 19:33

## 2024-04-27 RX ADMIN — IPRATROPIUM BROMIDE AND ALBUTEROL SULFATE 3 ML: .5; 3 SOLUTION RESPIRATORY (INHALATION) at 12:01

## 2024-04-27 RX ADMIN — POTASSIUM CHLORIDE 40 MEQ: 750 CAPSULE, EXTENDED RELEASE ORAL at 12:40

## 2024-04-27 RX ADMIN — FAMOTIDINE 40 MG: 20 TABLET ORAL at 08:52

## 2024-04-27 RX ADMIN — LEVOFLOXACIN 750 MG: 5 INJECTION, SOLUTION INTRAVENOUS at 18:50

## 2024-04-27 RX ADMIN — VANCOMYCIN HYDROCHLORIDE 1000 MG: 5 INJECTION, POWDER, LYOPHILIZED, FOR SOLUTION INTRAVENOUS at 16:22

## 2024-04-27 RX ADMIN — PREGABALIN 150 MG: 25 CAPSULE ORAL at 20:53

## 2024-04-27 RX ADMIN — ENOXAPARIN SODIUM 30 MG: 100 INJECTION SUBCUTANEOUS at 19:33

## 2024-04-27 RX ADMIN — PREGABALIN 150 MG: 25 CAPSULE ORAL at 08:52

## 2024-04-27 RX ADMIN — BUPRENORPHINE AND NALOXONE 1 TABLET: 8; 2 TABLET SUBLINGUAL at 08:53

## 2024-04-27 RX ADMIN — PREGABALIN 150 MG: 25 CAPSULE ORAL at 16:22

## 2024-04-27 RX ADMIN — OXCARBAZEPINE 600 MG: 300 TABLET, FILM COATED ORAL at 20:53

## 2024-04-27 RX ADMIN — VANCOMYCIN HYDROCHLORIDE 1000 MG: 5 INJECTION, POWDER, LYOPHILIZED, FOR SOLUTION INTRAVENOUS at 08:52

## 2024-04-27 RX ADMIN — CALCIUM GLUCONATE 1000 MG: 20 INJECTION, SOLUTION INTRAVENOUS at 12:40

## 2024-04-27 RX ADMIN — METRONIDAZOLE 500 MG: 5 INJECTION, SOLUTION INTRAVENOUS at 17:46

## 2024-04-27 RX ADMIN — METRONIDAZOLE 500 MG: 5 INJECTION, SOLUTION INTRAVENOUS at 08:51

## 2024-04-27 RX ADMIN — ONDANSETRON 4 MG: 2 INJECTION INTRAMUSCULAR; INTRAVENOUS at 08:53

## 2024-04-27 RX ADMIN — IPRATROPIUM BROMIDE AND ALBUTEROL SULFATE 3 ML: .5; 3 SOLUTION RESPIRATORY (INHALATION) at 07:17

## 2024-04-27 NOTE — PROGRESS NOTES
Respiratory Therapist Broncho-Pulmonary Hygiene Progress Note      Patient Name:  Rhett Aguirre  YOB: 1980    Rhett Aguirre meets the qualification for Level 2 of the Bronco-Pulmonary Hygiene Protocol. This was based on my daily patient assessment and includes review of chest x-ray results, cough ability and quality, oxygenation, secretions or risk for secretion development and patient mobility.     Broncho-Pulmonary Hygiene Assessment:    Level of Movement: Actively changing positions without assistance  Alert/ oriented/ cooperative    Breath Sounds: Clear to slightly diminished    Cough: Strong, effective    Chest X-Ray: Mild consolidation and/or atelectasis.    Sputum Productions: None or small amount of thin or watery secretions with effective cough    History and Physical: New onset of bronchitis or existing chronic pulmonary conditions.  **(not in an exacerbation)    SpO2 to Oxygen Need: greater than 92% on room air or  less than 3L nasal canula    Current SpO2 is: 92 on Room Air    Based on this information, I have completed the following interventions: Aerobika with bronchodialtor medication or TID      Electronically signed by Neyda Stephens RRT, 04/27/24, 10:22 AM EDT.

## 2024-04-27 NOTE — PROGRESS NOTES
"[facility] Clinical Pharmacy Services: Vancomycin Pharmacokinetic Initial Consult Note    Rhett Aguirre is a 43 y.o. male who is on day 1 of pharmacy to dose vancomycin for necrotizing pneumonia.    Consult Information  Consulting Provider: Andreina  Planned Duration of Therapy: 7 days  Was Patient Receiving Prior to Admission/Consult?: No  Loading Dose Ordered or Given: 1750 mg on   PK/PD Target: -600 mg/L.hr   Relevant ID History: z-gabrielle outpatient, no recent admissions past 90 days  Other Antimicrobials: Levofloxacin    Imaging Reviewed?: Yes    Microbiology Data  MRSA PCR performed: 24; Result: Pending  Culture/Source:    resp: 1+ gram positive cocci pairs/cluster; gram positive rods    Vitals/Labs  Ht: 180.3 cm (70.98\"); Wt: 88.5 kg (195 lb 1.7 oz)  Temp (24hrs), Av °F (37.2 °C), Min:97.9 °F (36.6 °C), Max:101 °F (38.3 °C)   Estimated Creatinine Clearance: 129.6 mL/min (by C-G formula based on SCr of 0.92 mg/dL).  Renal: no issues at time of note     Results from last 7 days   Lab Units 24  1701   CREATININE mg/dL 0.92   WBC 10*3/mm3 22.21*     Assessment/Plan:    Vancomycin Dose:  1250 mg IV every 12 hours; which provides the following predicted parameters:  AUC24,ss: 519 mg/L.hr  PAUC*: 76 %  Ctrough,ss: 15.9 mg/L  Pconc*: 32 %  Tox.: 11 %  Vanc Random planned for  at 2000  Patient has order for Basic Metabolic Panel x 3d    Pharmacy will follow patient's kidney function and will adjust doses and obtain levels as necessary. Thank you for involving pharmacy in this patient's care. Please contact pharmacy with any questions or concerns.                           Hector Champion, PharmD  Clinical Pharmacist        "

## 2024-04-27 NOTE — PAYOR COMM NOTE
"PATIENT INFORMATION  Name:  Rhett Aguirre  MRN#:     [id number]  :  1980         ADMISSION INFORMATION  CLASS: Inpatient   DOS:  24        CURRENT ATTENDING PROVIDER INFORMATION  Name/NPI: Yasmine Hdz MD [[id number]]  Phone:             Phone: [phone]        REQUESTING PROVIDER and RENDERING FACILITY  Name:  [facility]   NPI:  [id number]  TID:  [id number]  Address:      [address] [place] [address]  Phone  [phone]        UTILIZATION REVIEW CONTACT INFORMATION  Phone:      [phone]  Fax:           [phone]        ADMISSION DIAGNOSIS  Pneumonia [J18.9]  Acute febrile illness [R50.9]  Pneumonia of left lower lobe due to infectious organism [J18.9]  Sepsis without acute organ dysfunction, due to unspecified organism [A41.9]        ++++++++++++++++++++++++++++++++++++++++++++++++++++++++++++++++++++++++++++++++          Rhett Aguirre (43 y.o. Male)       Date of Birth   1980    Social Security Number       Address   [address] [place] [id number]    Home Phone   [phone]    MRN   [id number]       [facility]   Episcopalian    Marital Status   Single                            Admission Date   24    Admission Type   Emergency    Admitting Provider   Yasmine Hdz MD    Attending Provider   Yasmine Hdz MD    Department, Room/Bed   [facility] 4TH FLOOR MEDICAL TELEMETRY UNIT, 424/       Discharge Date       Discharge Disposition       Discharge Destination                                 Attending Provider: Yasmine Hdz MD    Allergies: Penicillins, Sulfa Antibiotics, Sulfamethoxazole-trimethoprim, Cefdinir, Sulfadiazine, Amoxicillin, Bee Venom, Cephalexin, Sulfacetamide-sulfur In Urea    Isolation: Contact Air   Infection: None   Code Status: CPR    Ht: 180.3 cm (70.98\")   Wt: 88.5 kg (195 lb 1.7 oz)    Admission Cmt: None   Principal Problem: Pneumonia [J18.9]             "       Active Insurance as of 2024       Primary Coverage       Payor Plan Insurance Group Employer/Plan Group    [employer] BY [employer] BY SAM [id number]       Payor Plan Address Payor Plan Phone Number Payor Plan Fax Number Effective Dates    [address] [address]   2021 - None Entered    [facility] [phone]         Subscriber Name Subscriber Birth Date Member ID       RHETT MARY 1980 [id number]                     Emergency Contacts        (Rel.) Home Phone Work Phone Mobile Phone    ZOYA MARY (Daughter) -- -- [phone]                 History & Physical        Yasmine Hdz MD at 24 1911           [facility]   HISTORY AND PHYSICAL    Patient Name: Rhett Mary  : 1980  MRN: [id number]  Primary Care Physician:  Chris Potts MD  Date of admission: 2024    Subjective  Subjective     Chief Complaint: Shortness of breath    HPI:    Rhett Mary is a 43 y.o. male 43-year-old male chronically on Suboxone, bipolar disorder came in due to cough sore throat nausea and vomiting associated with fever.  The symptoms have been ongoing for about a week and he was given a Z-Richie in outpatient setting.  His COVID flu and RSV were all negative.  Due to his worsening symptoms associated with pleuritic chest pain patient presented to the ER    In the ER patient's temperature was noted to be 101.  His blood pressures and vitals were otherwise stable.  His chest x-ray showed a left lower lobe consolidation concerning for pneumonia.  He was also noted to have a small pleural effusion.  Subsequently chest CT which has not yet been read showed concerns for cavitation with air-fluid bronchograms consistent with dense pneumonia and mild pleural effusion.  Patient admitted for further management of pneumonia    Review of Systems  All review of systems negative except as in subjective complaints:  Personal History      Past Medical History:   Diagnosis Date   • Arthritis    • Depression    • Hyperlipidemia    • Hypertension    • Opioid abuse    • Substance abuse    • Tobacco abuse        Past Surgical History:   Procedure Laterality Date   • BACK SURGERY         Family History: family history is not on file. Otherwise pertinent FHx was reviewed and not pertinent to current issue.    Social History:  reports that he has been smoking cigarettes. He has a 20 pack-year smoking history. He has never been exposed to tobacco smoke. He has never used smokeless tobacco. He reports that he does not currently use drugs. He reports that he does not drink alcohol.    Home Medications:  OXcarbazepine, QUEtiapine, azithromycin, buprenorphine-naloxone, cloNIDine, omeprazole, ondansetron ODT, and pregabalin      Allergies:  Allergies   Allergen Reactions   • Penicillins Rash and Swelling   • Sulfa Antibiotics Swelling and Rash   • Sulfamethoxazole-Trimethoprim Swelling, Unknown - High Severity and Rash   • Cefdinir Hives     Unknown reaction   • Sulfadiazine Unknown - High Severity   • Amoxicillin Unknown - High Severity, Swelling and Rash   • Bee Venom Rash and Hives   • Cephalexin Rash, Unknown - High Severity and Hives     Unknown reaction   • Sulfacetamide-Sulfur In Urea Rash       Objective  Objective     Vitals:   Temp:  [99 °F (37.2 °C)-101 °F (38.3 °C)] 99 °F (37.2 °C)  Heart Rate:  [] 91  Resp:  [18-20] 18  BP: (130-142)/(68-80) 132/78  Physical Exam               Constitutional:         Awake, alert responsive, conversant, no obvious distress   Eyes:                       PERRLA, sclerae anicteric, no conjunctival injection   HEENT:                   Moist mucous membranes, no nasal or eye discharge, no throat congestion   Neck:                      Supple, no thyromegaly, no lymphadenopathy, trachea midline, no elevated JVD   Respiratory:           Clear to auscultation bilaterally, nonlabored respirations    Cardiovascular:      RRR, no murmurs, rubs, or gallops, palpable pedal pulses bilaterally,No bilateral ankle edema   Gastrointestinal:   Positive bowel sounds, soft, nontender, nondistended, no organomegaly   Musculoskeletal:   No clubbing or cyanosis to extremities, muscle wasting, joint swelling, muscle weakness   Psychiatric:             Appropriate affect, cooperative   Neurologic:            Awake alert ,oriented x 3, strength symmetric in all extremities, Cranial Nerves grossly intact to confrontation, speech clear   Skin:                      No rashes, bruising, skin ulcers, petechiae or ecchymosis    Result Review   Result Review:  I have personally reviewed the results from the time of this admission to 4/26/2024 19:13 EDT and agree with these findings:  []  Laboratory  []  Microbiology  []  Radiology  []  EKG/Telemetry   []  Cardiology/Vascular   []  Pathology  []  Old records  []  Other:    Results from last 7 days   Lab Units 04/26/24  1701   WBC 10*3/mm3 22.21*   HEMOGLOBIN g/dL 10.0*   PLATELETS 10*3/mm3 598*     Results from last 7 days   Lab Units 04/26/24  1701   SODIUM mmol/L 135*   POTASSIUM mmol/L 3.5   CHLORIDE mmol/L 96*   CO2 mmol/L 27.6   ANION GAP mmol/L 11.4   BUN mg/dL 15   CREATININE mg/dL 0.92   GLUCOSE mg/dL 109*         Assessment & Plan  Assessment / Plan     Active Hospital Problems:  Active Hospital Problems    Diagnosis    • **Pneumonia      43-year-old male chronically on Suboxone, bipolar disorder came in due to cough sore throat nausea and vomiting associated with fever in background of being treated with Z-Richie noted to have high-grade fever upon admission and x-ray showing left lower lobe consolidation and on CT scan showing dense consolidation with air-fluid bronchograms with mild pleural effusion.    Plan:   Urine strep and Legionella ordered  Pro-Deniz ordered  Due to patient's allergies patient has been started on Levaquin IV.  DuoNebs scheduled  Continue Suboxone at home dose  Continue  Triptil, Lyrica and Seroquel at home doses  Will need pulm consult in a.m.    DVT prophylaxis:  Medical DVT prophylaxis orders are signed and held.          CODE STATUS:    Code Status (Patient has no pulse and is not breathing): CPR (Attempt to Resuscitate)  Medical Interventions (Patient has pulse or is breathing): Full Support    Admission Status:  I believe this patient meets inpatient status.    Electronically signed by Yasmine Hdz MD, 04/26/24, 7:13 PM EDT.             Electronically signed by Yasmine Hdz MD at 04/26/24 1916          Emergency Department Notes        Girma Connors MD at 04/26/24 1737          Time: 5:37 PM EDT  Date of encounter:  4/26/2024  Independent Historian/Clinical History and Information was obtained by:   Patient    History is limited by: N/A    Chief Complaint: Cough, nausea and vomiting, fever      History of Present Illness:  Patient is a 43 y.o. year old male who presents to the emergency department for evaluation of cough, sore throat, nausea and vomiting, weakness and fever, starting last week.    He arrives with fever of 101 Fahrenheit today.    He has already been swabbed for COVID and flu and RSV virus and strep throat and it is all been negative at the urgent care clinic earlier in the week and he was started prophylactically on Z-Richie antibiotic.    He reports he is not getting any better.  And coughing up green sputum and having some pleuritic pain in the left side of the rib cage.  He feels very thirsty or dehydrated despite drinking Pedialyte.    HPI    Patient Care Team  Primary Care Provider: Chris Potts MD    Past Medical History:     Allergies   Allergen Reactions   • Penicillins Rash and Swelling   • Sulfa Antibiotics Swelling and Rash   • Sulfamethoxazole-Trimethoprim Swelling, Unknown - High Severity and Rash   • Cefdinir Hives     Unknown reaction   • Sulfadiazine Unknown - High Severity   • Amoxicillin Unknown - High Severity,  Swelling and Rash   • Bee Venom Rash and Hives   • Cephalexin Rash, Unknown - High Severity and Hives     Unknown reaction   • Sulfacetamide-Sulfur In Urea Rash     Past Medical History:   Diagnosis Date   • Arthritis    • Depression    • Hyperlipidemia    • Hypertension    • Opioid abuse    • Substance abuse    • Tobacco abuse      Past Surgical History:   Procedure Laterality Date   • BACK SURGERY       History reviewed. No pertinent family history.    Home Medications:  Prior to Admission medications    Medication Sig Start Date End Date Taking? Authorizing Provider   azithromycin (Zithromax Z-Richie) 250 MG tablet Take 2 tablets by mouth on day 1, then 1 tablet daily on days 2-5 4/22/24   Yudi Gore APRN   buprenorphine-naloxone (SUBOXONE) 8-2 MG film film PLACE & DISSOLVE 2 FILMS UNDER THE TONGUE ONCE DAILY 9/2/23   Glen Wei MD   cloNIDine (CATAPRES) 0.3 MG tablet TAKE ONE TABLET BY MOUTH UP TO THREE TIMES DAILY AS NEEDED FOR ANXIETY 1/31/23   Glen Wei MD   EPINEPHrine (EPIPEN) 0.3 MG/0.3ML solution auto-injector injection See Admin Instructions.    Emergency, Nurse Epic, RN   fenofibrate (TRICOR) 145 MG tablet Take 1 tablet by mouth Daily. 11/8/22   Glen Wei MD   furosemide (LASIX) 20 MG tablet Take 1 tablet by mouth Daily. 11/8/22   Glen Wei MD   gabapentin (NEURONTIN) 600 MG tablet Take 1 tablet by mouth 3 (Three) Times a Day. 6/5/23   Glen Wei MD   hydroCHLOROthiazide (HYDRODIURIL) 25 MG tablet Take 1 tablet by mouth Daily. 9/3/22   Virginia Kay APRN   hydrOXYzine pamoate (VISTARIL) 100 MG capsule Take 1 capsule by mouth three times a day as needed for anxiety/insomnia. 9/1/23   Glen Wei MD   ibuprofen (ADVIL,MOTRIN) 800 MG tablet Take 1 tablet by mouth Every 6 (Six) Hours As Needed for Mild Pain. 6/26/23   Krish Barth APRN   metoprolol succinate XL (TOPROL-XL) 50 MG 24 hr tablet Take 1 tablet by mouth  "Daily. 11/9/22   Glen Wei MD   mupirocin (BACTROBAN) 2 % ointment Apply 1 application topically to the appropriate area as directed 3 (Three) Times a Day. 7/1/23   Jagdish Abebe,    omeprazole (priLOSEC) 20 MG capsule Prilosec 20 mg oral capsule,delayed release(DR/EC) take 1 capsule (20 mg) by oral route once daily before a meal   Suspended    Emergency, Nurse Adriano, RN   ondansetron ODT (ZOFRAN-ODT) 4 MG disintegrating tablet Place 1 tablet on the tongue Every 8 (Eight) Hours As Needed for Vomiting or Nausea. 4/22/24   Yudi Gore APRN   pantoprazole (PROTONIX) 40 MG EC tablet     Glen Wei MD   QUEtiapine (SEROquel) 200 MG tablet  9/11/23   Glen Wei MD   sildenafil (VIAGRA) 50 MG tablet Take 1 tablet by mouth Daily. 5/22/23   Glen Wei MD   venlafaxine XR (EFFEXOR-XR) 75 MG 24 hr capsule Take 1 capsule by mouth Daily. 9/1/23   Glen Wei MD        Social History:   Social History     Tobacco Use   • Smoking status: Every Day     Current packs/day: 1.00     Average packs/day: 1 pack/day for 20.0 years (20.0 ttl pk-yrs)     Types: Cigarettes     Passive exposure: Never   • Smokeless tobacco: Never   Vaping Use   • Vaping status: Never Used   Substance Use Topics   • Alcohol use: Never   • Drug use: Not Currently     Comment: h/o heroine opiod dependency         Review of Systems:  Review of Systems   I performed a 10 point review of systems which was all negative, except for the positives found in the HPI above.  Physical Exam:  /68 (BP Location: Left arm, Patient Position: Lying)   Pulse 98   Temp (!) 101 °F (38.3 °C) (Oral)   Resp 18   Ht 180.3 cm (71\")   Wt 91.3 kg (201 lb 4.5 oz)   SpO2 91%   BMI 28.07 kg/m²     Physical Exam   General: Awake alert and in mild distress, looks to be feeling unwell, febrile    HEENT: Head normocephalic atraumatic, eyes PERRLA EOMI, nose normal, oropharynx normal.  Mucous membranes look dry, " dehydrated    Neck: Supple full range of motion, no meningismus, no lymphadenopathy    Heart: Tachycardic with a regular rhythm, no murmurs or rubs, 2+ radial pulses bilaterally    Lungs: Decreased breath sounds in the left lung base, mild tachypnea,, no respiratory distress    Abdomen: Soft, nontender, nondistended, no rebound or guarding    Skin: Warm, dry, no rash    Musculoskeletal: Normal range of motion, no lower extremity edema    Neurologic: Oriented x3, no motor deficits no sensory deficits    Psychiatric: Mood appears stable, no psychosis          Procedures:  Procedures      Medical Decision Making:      Comorbidities that affect care:    Smoking, Substance Abuse    External Notes reviewed:    Previous Labs: I compared today's lab work including elevated white blood cell count with his previous labs and it looks like he has a chronically elevated white blood cell count but it looks worse today and with a left shift now.      The following orders were placed and all results were independently analyzed by me:  Orders Placed This Encounter   Procedures   • COVID-19, FLU A/B, RSV PCR 1 HR TAT - Swab, Nasopharynx   • Rapid Strep A Screen - Swab, Throat   • Blood Culture - Blood,   • Blood Culture - Blood,   • Beta Strep Culture, Throat - Swab, Throat   • XR Chest 1 View   • CT Chest Without Contrast Diagnostic   • Comprehensive Metabolic Panel   • Lactic Acid, Plasma   • [place] Draw   • CBC Auto Differential   • Undress & Gown   • Continuous Pulse Oximetry   • Vital Signs   • Nephrology  (on-call MD unless specified)   • Oxygen Therapy- Nasal Cannula; Titrate 1-6 LPM Per SpO2; 90 - 95%   • Insert Peripheral IV   • Insert Peripheral IV   • Inpatient Admission   • CBC & Differential   • Green Top (Gel)   • Lavender Top   • Gold Top - SST   • Light Blue Top       Medications Given in the Emergency Department:  Medications   sodium chloride 0.9 % flush 10 mL (has no administration in time range)   sodium chloride  0.9 % flush 10 mL (has no administration in time range)   sepsis fluid NS 0.9 % bolus 2,739 mL (2,739 mL Intravenous New Bag 4/26/24 1801)   levoFLOXacin (LEVAQUIN) 750 mg/150 mL D5W (premix) (LEVAQUIN) 750 mg (750 mg Intravenous New Bag 4/26/24 1819)   ibuprofen (ADVIL,MOTRIN) tablet 800 mg (800 mg Oral Given 4/26/24 1708)        ED Course:         Labs:    Lab Results (last 24 hours)       Procedure Component Value Units Date/Time    COVID-19, FLU A/B, RSV PCR 1 HR TAT - Swab, Nasopharynx [[id number]]  (Normal) Collected: 04/26/24 1635    Specimen: Swab from Nasopharynx Updated: 04/26/24 1717     COVID19 Not Detected     Influenza A PCR Not Detected     Influenza B PCR Not Detected     RSV, PCR Not Detected    Narrative:      Fact sheet for providers: https://www.fda.gov/media/001262/download    Fact sheet for patients: https://www.fda.gov/media/813579/download    Test performed by PCR.    Rapid Strep A Screen - Swab, Throat [[id number]]  (Normal) Collected: 04/26/24 1635    Specimen: Swab from Throat Updated: 04/26/24 1651     Strep A Ag Negative    Beta Strep Culture, Throat - Swab, Throat [[id number]] Collected: 04/26/24 1635    Specimen: Swab from Throat Updated: 04/26/24 1651    CBC & Differential [[id number]]  (Abnormal) Collected: 04/26/24 1701    Specimen: Blood Updated: 04/26/24 1711    Narrative:      The following orders were created for panel order CBC & Differential.  Procedure                               Abnormality         Status                     ---------                               -----------         ------                     CBC Auto Differential[[id number]]        Abnormal            Final result                 Please view results for these tests on the individual orders.    Comprehensive Metabolic Panel [[id number]]  (Abnormal) Collected: 04/26/24 1701    Specimen: Blood Updated: 04/26/24 1728     Glucose 109 mg/dL      BUN 15 mg/dL      Creatinine 0.92 mg/dL      Sodium 135 mmol/L       Potassium 3.5 mmol/L      Chloride 96 mmol/L      CO2 27.6 mmol/L      Calcium 8.1 mg/dL      Total Protein 6.6 g/dL      Albumin 3.0 g/dL      ALT (SGPT) 36 U/L      AST (SGOT) 48 U/L      Alkaline Phosphatase 130 U/L      Total Bilirubin 0.3 mg/dL      Globulin 3.6 gm/dL      A/G Ratio 0.8 g/dL      BUN/Creatinine Ratio 16.3     Anion Gap 11.4 mmol/L      eGFR 105.8 mL/min/1.73     Narrative:      GFR Normal >60  Chronic Kidney Disease <60  Kidney Failure <15      Lactic Acid, Plasma [[id number]]  (Normal) Collected: 04/26/24 1701    Specimen: Blood Updated: 04/26/24 1724     Lactate 0.9 mmol/L     Blood Culture - Blood, Arm, Left [[id number]] Collected: 04/26/24 1701    Specimen: Blood from Arm, Left Updated: 04/26/24 1711    Blood Culture - Blood, Arm, Right [[id number]] Collected: 04/26/24 1701    Specimen: Blood from Arm, Right Updated: 04/26/24 1710    CBC Auto Differential [[id number]]  (Abnormal) Collected: 04/26/24 1701    Specimen: Blood Updated: 04/26/24 1711     WBC 22.21 10*3/mm3      RBC 3.52 10*6/mm3      Hemoglobin 10.0 g/dL      Hematocrit 29.3 %      MCV 83.2 fL      MCH 28.4 pg      MCHC 34.1 g/dL      RDW 14.9 %      RDW-SD 44.8 fl      MPV 10.3 fL      Platelets 598 10*3/mm3      Neutrophil % 77.2 %      Lymphocyte % 13.9 %      Monocyte % 6.6 %      Eosinophil % 0.5 %      Basophil % 0.3 %      Immature Grans % 1.5 %      Neutrophils, Absolute 17.17 10*3/mm3      Lymphocytes, Absolute 3.08 10*3/mm3      Monocytes, Absolute 1.46 10*3/mm3      Eosinophils, Absolute 0.10 10*3/mm3      Basophils, Absolute 0.07 10*3/mm3      Immature Grans, Absolute 0.33 10*3/mm3      nRBC 0.0 /100 WBC              Imaging:    XR Chest 1 View    Result Date: 4/26/2024  XR CHEST 1 VW-  Date of Exam: 4/26/2024 4:40 PM  Indication: cough for over a week.  Comparison Exams: June 14, 2023  Technique: Single AP chest radiograph  FINDINGS: There is a consolidation within the left lower lung with small left pleural effusion.  The heart and mediastinal contours appear normal. The pulmonary vasculature appears normal. The osseous structures appear intact.      1.  Consolidation within left lower lobe, concerning for pneumonia or aspiration. 2.  Small left pleural effusion.   Electronically Signed By-Vince Severino MD On:4/26/2024 4:49 PM         Differential Diagnosis and Discussion:    Cough: Differential diagnosis includes but is not limited to pneumonia, acute bronchitis, upper respiratory infection, ACE inhibitor use, allergic reaction, epiglottitis, seasonal allergies, chemical irritants, exercise-induced asthma, viral syndrome.  Fever: Based on the complaint of fever, differential diagnosis includes but is not limited to meningitis, pneumonia, pyelonephritis, acute uti,  systemic immune response syndrome, sepsis, viral syndrome, fungal infection, tick born illness and other bacterial infections.    All labs were reviewed and interpreted by me.  All X-rays impressions were independently interpreted by me.    MDM     Amount and/or Complexity of Data Reviewed  Clinical lab tests: reviewed  Decide to obtain previous medical records or to obtain history from someone other than the patient: yes           This patient is a 43-year-old male with previous history of IV drug use now clean for 2 years, is now presenting with productive cough with greenish sputum and weakness and some nausea and vomiting and is noted to be febrile on arrival to 101 °F and tachycardic and tachypneic meeting SIRS criteria.    I reviewed the image of his chest x-ray showing a left lower lobe consolidation and pleural effusion, concerning for pneumonia.        **It looks like he meets sepsis criteria and I recognized this at 5:40 PM and starting IV broad-spectrum antibiotics and IV fluids and sending off blood cultures and lactic acid today.**      I will plan to admit him to the hospital for further management of community-acquired pneumonia.      Critical Care  Note: Total Critical Care time of 35 minutes. Total critical care time documented does not include time spent on separately billed procedures for services of nurses or physician assistants. I personally saw and examined the patient. I have reviewed all diagnostic interpretations and treatment plans as written. I was present for the key portions of any procedures performed and the inclusive time noted in any critical care statement. Critical care time includes patient management by me, time spent at the patients bedside,  time to review lab and imaging results, discussing patient care, documentation in the medical record, and time spent with family or caregiver.        Patient Care Considerations:          Consultants/Shared Management Plan:    PCP: I have discussed the case with his on-call PCP who agrees to accept the patient for admission.    Social Determinants of Health:    Patient is independent, reliable, and has access to care.       Disposition and Care Coordination:    Admit:   Through independent evaluation of the patient's history, physical, and imperical data, the patient meets criteria for inpatient admission to the hospital.        Final diagnoses:   Pneumonia of left lower lobe due to infectious organism   Sepsis without acute organ dysfunction, due to unspecified organism   Acute febrile illness        ED Disposition       ED Disposition   Decision to Admit    Condition   --    Comment   Level of Care: Telemetry [5]   Diagnosis: Pneumonia [041455]   Admitting Physician: KARO HOUSER [[id number]]   Attending Physician: KARO HOUSER [[id number]]   Isolate for COVID?: No [0]   Certification: I Certify That Inpatient Hospital Services Are Medically Necessary For Greater Than 2 Midnights                 This medical record created using voice recognition software.             Girma Connors MD  04/26/24 1816       Girma Connors MD  04/26/24 1830      Electronically signed by Girma Connors MD at  04/26/24 1830       Vital Signs (last day)       Date/Time Temp Temp src Pulse Resp BP Patient Position SpO2    04/26/24 2005 98.1 (36.7) Oral -- 18 118/71 Sitting 95 04/26/24 1955 -- -- -- 18 -- -- 95    04/26/24 1835 99 (37.2) Oral 91 18 132/78 Lying 91    04/26/24 1830 -- -- 90 -- 132/78 -- 91    04/26/24 1730 -- -- 98 18 130/68 Lying 91    04/26/24 1633 101 (38.3) Oral 117 20 142/80 -- 94

## 2024-04-27 NOTE — PROGRESS NOTES
[facility]     Progress Note    Patient Name: Rhett Aguirre  : 1980  MRN: [id number]  Primary Care Physician:  Chris Potts MD  Date of admission: 2024    Subjective     Patient was seen at the bedside today.  He was febrile and tachycardic.    Objective   Objective     Vitals:   Temp:  [97.9 °F (36.6 °C)-101 °F (38.3 °C)] 100.6 °F (38.1 °C)  Heart Rate:  [] 108  Resp:  [18-20] 18  BP: (118-142)/(68-80) 134/74    Physical Exam   Physical Exam    Constitutional:          Awake, alert responsive, conversant, no obvious distress              Eyes:                          PERRLA, sclerae anicteric, no conjunctival injection              HEENT:                      Moist mucous membranes, no nasal or eye discharge, no throat congestion              Neck:                          Supple, no thyromegaly, no lymphadenopathy, trachea midline, no elevated JVD              Respiratory:               Clear to auscultation bilaterally, nonlabored respirations               Cardiovascular:         RRR, no murmurs, rubs, or gallops, palpable pedal pulses bilaterally,No bilateral ankle edema              Gastrointestinal:       Positive bowel sounds, soft, nontender, nondistended, no organomegaly              Musculoskeletal:       No clubbing or cyanosis to extremities, muscle wasting, joint swelling, muscle weakness              Psychiatric:               Appropriate affect, cooperative              Neurologic:                Awake alert ,oriented x 3, strength symmetric in all extremities, Cranial Nerves grossly intact to confrontation, speech clear              Skin:                           No rashes, bruising, skin ulcers, petechiae or ecchymosis    Result Review    Result Review:  I have personally reviewed the results from the time of this admission to 2024 08:14 EDT and agree with these findings:  [x]  Laboratory  []  Microbiology  [x]  Radiology  []  EKG/Telemetry   []   Cardiology/Vascular   []  Pathology  []  Old records  []  Other:    Assessment & Plan   Assessment / Plan       Active Hospital Problems:    Active Hospital Problems    Diagnosis  POA    **Pneumonia [J18.9]  Yes       43-year-old male chronically on Suboxone, bipolar disorder came in due to cough sore throat nausea and vomiting associated with fever in background of being treated with Z-Richie noted to have high-grade fever upon admission and x-ray showing left lower lobe consolidation and on CT scan showing dense consolidation with necrosis with mild pleural effusion.     Plan:   Urine strep and Legionella negative  Sputum cultures Gram stain is growing gram-positive rods and DNA probe is positive for MRSA  Continue Levaquin IV and vanc. Patient will need longer course antibiotics  DuoNebs scheduled  Continue Suboxone at home dose  Continue Triptil, Lyrica and Seroquel at home doses  Pulmonology consult placed for potential bronc       Electronically signed by Andreina Tam MD, 04/27/24, 8:14 AM EDT.

## 2024-04-27 NOTE — PLAN OF CARE
Goal Outcome Evaluation:  Plan of Care Reviewed With: patient        Progress: no change  Outcome Evaluation: Patient alert and oriented, vital signs stable. Patient has no complaints or pain or discomfort at this time.Patient up ad candido. Continue plan of care

## 2024-04-27 NOTE — CONSULTS
Pulmonary / Critical Care Consult Note      Patient Name: Rhett Aguirre  : 1980  MRN: [id number]  Primary Care Physician:  Chris Potts MD  Referring Physician: Yasmine Hdz MD  Date of admission: 2024    Subjective   Subjective     Reason for Consult/ Chief Complaint:   Cavitary lesion, abnormal chest CT    HPI:  Rhett Aguirre is a 43 y.o. male with a limited past medical history of tobacco abuse of cigarettes and substance abuse with heroin, marijuana, and methamphetamines on Suboxone presented to the ED for evaluation of sore throat, nausea, vomiting, fever, and chest pain.  In the ED labs were unremarkable except for an elevated procalcitonin of 0.34 and leukocytosis.   chest CT demonstrated a 10 cm cavitary lesion in the left lower lobe left large air-fluid level suggesting communication with the tracheobronchial tree with mild mediastinal adenopathy.  For the above reasons, the service was consulted to assist in the management and treatment of patient's acute issues.  Upon assessment, patient lying in bed on room air in no apparent respiratory distress.  Findings and plan were discussed with patient.  Patient stated that he recently received antibiotics for strep throat with no resolution of his current symptoms.  Patient reporting that he had been experiencing feeling unwell for over a week with sore throat, coughing, nausea and vomiting, diarrhea, losing weight, and fever.  Patient also stated that he had recently been choking on food more frequently.  Patient stated that he has been clean for 2 years now and recently has attempted to quit smoking.        Personal History     Past Medical History:   Diagnosis Date    Arthritis     Depression     Hyperlipidemia     Hypertension     Opioid abuse     Substance abuse     Tobacco abuse        Past Surgical History:   Procedure Laterality Date    BACK SURGERY         Family History: family history includes Heart  disease in his father. Otherwise pertinent FHx was reviewed and not pertinent to current issue.    Social History:  reports that he quit smoking about 3 months ago. His smoking use included cigarettes. He started smoking about 30 years ago. He has a 50 pack-year smoking history. He has never been exposed to tobacco smoke. He has never used smokeless tobacco. He reports that he does not currently use alcohol. He reports that he does not currently use drugs after having used the following drugs: Methamphetamines, Marijuana, and Heroin.    Home Medications:  OXcarbazepine, QUEtiapine, azithromycin, buprenorphine-naloxone, cloNIDine, omeprazole, ondansetron ODT, and pregabalin    Allergies:  Allergies   Allergen Reactions    Penicillins Rash and Swelling    Sulfa Antibiotics Swelling and Rash    Sulfamethoxazole-Trimethoprim Swelling, Unknown - High Severity and Rash    Cefdinir Hives     Unknown reaction    Sulfadiazine Unknown - High Severity    Amoxicillin Unknown - High Severity, Swelling and Rash    Bee Venom Rash and Hives    Cephalexin Rash, Unknown - High Severity and Hives     Unknown reaction    Sulfacetamide-Sulfur In Urea Rash       Objective    Objective     Vitals:   Temp:  [97.9 °F (36.6 °C)-101 °F (38.3 °C)] 100.6 °F (38.1 °C)  Heart Rate:  [] 108  Resp:  [18-20] 18  BP: (118-142)/(68-80) 134/74    Physical Exam:  Vital Signs Reviewed   General:  WDWN, Alert, NAD.  Pleasant male, lying in bed  HEENT:  PERRL, EOMI.  OP, nares clear, no sinus tenderness  Neck:  Supple, no JVD, no thyromegaly  Lymph: no axillary, cervical, supraclavicular lymphadenopathy noted bilaterally  Chest: Good aeration with scattered rhonchi, on room air  CV: RRR, no MGR, pulses 2+, equal.  Abd:  Soft, NT, ND, + BS, no HSM  EXT:  no clubbing, no cyanosis, no edema, no joint tenderness  Neuro:  A&Ox3, CN grossly intact, no focal deficits.  Skin: No rashes or lesions noted      Result Review    Result Review:  I have personally  reviewed the results from the time of this admission to 4/27/2024 08:31 EDT and agree with these findings:  [x]  Laboratory  [x]  Microbiology  [x]  Radiology  []  EKG/Telemetry   []  Cardiology/Vascular   []  Pathology  [x]  Old records  [x]  Other:  Most notable findings include:         Lab 04/27/24  0453 04/26/24  1701   WBC  --  22.21*   HEMOGLOBIN  --  10.0*   HEMATOCRIT  --  29.3*   PLATELETS  --  598*   SODIUM 140 135*   POTASSIUM 3.4* 3.5   CHLORIDE 104 96*   CO2 25.0 27.6   BUN 9 15   CREATININE 0.63* 0.92   GLUCOSE 89 109*   CALCIUM 7.9* 8.1*   TOTAL PROTEIN  --  6.6   ALBUMIN  --  3.0*   GLOBULIN  --  3.6       Assessment & Plan   Assessment / Plan     Active Hospital Problems:  Active Hospital Problems    Diagnosis     **Pneumonia      Impression:  Left lower lobe necrotizing pneumonia from unspecified organism  Left lower lobe lung abscess from unspecified organism  Question aspiration and airways versus necrotizing pneumonia  Mediastinal adenopathy likely reactive  Leukocytosis  Hypocalcemia  Hypokalemia  Therapeutic drug monitoring of antibiotics  History substance abuse, heroin, marijuana, methamphetamines on Suboxone  History of tobacco abuse of cigarettes     Plan:  -Remain on room air.  Continue to maintain SpO2 greater than 90%  -4/26 chest CT demonstrating a 10 cm cavitary lesion in the left lower lobe with mild mediastinal adenopathy consistent with necrotizing pneumonia  -Will need bronchoscopy to clear airways and assess for what organism is causing this lung abscess  -Will take for bronchoscopy with airway inspection, brushings, biopsies, bronchoalveolar lavage on Monday, 29 April 2024.  I have discussed the risks of the procedure with the patient including pneumothorax, hemothorax, bleeding, hypoxia, required mechanical ventilation and death. The patient recognizes these findings, acknowledges these findings and is agreeable to the procedure.  Dr. Parson to perform the procedure  -MRSA PCR  positive, procalcitonin 0.34  -HIV antibodies negative.  Check quant to Farren TB Gold, Fungitell beta D glucan, histoplasma antigen, ESR>130 and CRP, 26  -Agree with vancomycin.  MRSA PCR positive.  Patient has multiple severe beta-lactam allergy to penicillins and cephalosporins. Treatment in the setting of severe necrotizing lung and lung abscess with anaerobic coverage is Levaquin and Flagyl.  Will need a minimum of 6 weeks of treatment for lung abscess.  -Will need noncontrast chest CT in 1 month  -Check BNP and echo  -Initiated bronchopulmonary bronchodilator protocols.  Encourage I-S and flutter  -Encourage activity as tolerated  -Continue to trend renal panel and electrolytes.  Trend calcium IV and potassium orally     DVT prophylaxis:  Medical DVT prophylaxis orders are present.    Code Status and Medical Interventions:   Ordered at: 04/26/24 1910     Code Status (Patient has no pulse and is not breathing):    CPR (Attempt to Resuscitate)     Medical Interventions (Patient has pulse or is breathing):    Full Support        Labs, imaging, microbiology, notes and medications personally reviewed  Discussed with primary    I, Dr. Juan Sequeira, have spent more than 50% of the total time managing the patient in this encounter today.  This included personally reviewing all pertinent labs, imaging, microbiology and documentation. Also discussing the case with the patient and any available family, the admitting physician and any available ancillary staff.    Electronically signed by TERRI Roger, 04/27/24, 9:58 AM EDT.  Electronically signed by Juan Sequeira MD, 04/27/24, 11:36 AM EDT.

## 2024-04-28 LAB
ALBUMIN SERPL-MCNC: 2.6 G/DL (ref 3.5–5.2)
ALBUMIN/GLOB SERPL: 0.7 G/DL
ALP SERPL-CCNC: 96 U/L (ref 39–117)
ALT SERPL W P-5'-P-CCNC: 26 U/L (ref 1–41)
ANION GAP SERPL CALCULATED.3IONS-SCNC: 11 MMOL/L (ref 5–15)
AST SERPL-CCNC: 22 U/L (ref 1–40)
BACTERIA SPEC AEROBE CULT: NORMAL
BASOPHILS # BLD AUTO: 0.07 10*3/MM3 (ref 0–0.2)
BASOPHILS NFR BLD AUTO: 0.4 % (ref 0–1.5)
BH CV ECHO MEAS - AO ROOT DIAM: 3.4 CM
BH CV ECHO MEAS - EDV(CUBED): 95.4 ML
BH CV ECHO MEAS - EDV(MOD-SP2): 67.9 ML
BH CV ECHO MEAS - EDV(MOD-SP4): 67.5 ML
BH CV ECHO MEAS - EF(MOD-BP): 63.3 %
BH CV ECHO MEAS - EF(MOD-SP2): 59.9 %
BH CV ECHO MEAS - EF(MOD-SP4): 67.4 %
BH CV ECHO MEAS - ESV(CUBED): 32.8 ML
BH CV ECHO MEAS - ESV(MOD-SP2): 27.2 ML
BH CV ECHO MEAS - ESV(MOD-SP4): 22 ML
BH CV ECHO MEAS - FS: 30 %
BH CV ECHO MEAS - IVS/LVPW: 0.71 CM
BH CV ECHO MEAS - IVSD: 0.8 CM
BH CV ECHO MEAS - LA DIMENSION: 3.3 CM
BH CV ECHO MEAS - LAT PEAK E' VEL: 13.1 CM/SEC
BH CV ECHO MEAS - LV DIASTOLIC VOL/BSA (35-75): 32.4 CM2
BH CV ECHO MEAS - LV MASS(C)D: 148.6 GRAMS
BH CV ECHO MEAS - LV SYSTOLIC VOL/BSA (12-30): 10.5 CM2
BH CV ECHO MEAS - LVIDD: 4.6 CM
BH CV ECHO MEAS - LVIDS: 3.2 CM
BH CV ECHO MEAS - LVOT AREA: 3.1 CM2
BH CV ECHO MEAS - LVOT DIAM: 2 CM
BH CV ECHO MEAS - LVPWD: 1.12 CM
BH CV ECHO MEAS - MED PEAK E' VEL: 11.9 CM/SEC
BH CV ECHO MEAS - MV A MAX VEL: 99 CM/SEC
BH CV ECHO MEAS - MV DEC SLOPE: 680 CM/SEC2
BH CV ECHO MEAS - MV DEC TIME: 0.17 SEC
BH CV ECHO MEAS - MV E MAX VEL: 113 CM/SEC
BH CV ECHO MEAS - MV E/A: 1.14
BH CV ECHO MEAS - RVDD: 2.5 CM
BH CV ECHO MEAS - SV(MOD-SP2): 40.7 ML
BH CV ECHO MEAS - SV(MOD-SP4): 45.5 ML
BH CV ECHO MEAS - SVI(MOD-SP2): 19.5 ML/M2
BH CV ECHO MEAS - SVI(MOD-SP4): 21.8 ML/M2
BH CV ECHO MEAS - TAPSE (>1.6): 2.5 CM
BH CV ECHO MEASUREMENTS AVERAGE E/E' RATIO: 9.04
BILIRUB SERPL-MCNC: 0.2 MG/DL (ref 0–1.2)
BUN SERPL-MCNC: 4 MG/DL (ref 6–20)
BUN/CREAT SERPL: 5.9 (ref 7–25)
CALCIUM SPEC-SCNC: 8.5 MG/DL (ref 8.6–10.5)
CHLORIDE SERPL-SCNC: 99 MMOL/L (ref 98–107)
CO2 SERPL-SCNC: 24 MMOL/L (ref 22–29)
CREAT SERPL-MCNC: 0.68 MG/DL (ref 0.76–1.27)
DEPRECATED RDW RBC AUTO: 45.8 FL (ref 37–54)
EGFRCR SERPLBLD CKD-EPI 2021: 118.3 ML/MIN/1.73
EOSINOPHIL # BLD AUTO: 0.19 10*3/MM3 (ref 0–0.4)
EOSINOPHIL NFR BLD AUTO: 1 % (ref 0.3–6.2)
ERYTHROCYTE [DISTWIDTH] IN BLOOD BY AUTOMATED COUNT: 15 % (ref 12.3–15.4)
GLOBULIN UR ELPH-MCNC: 3.8 GM/DL
GLUCOSE SERPL-MCNC: 109 MG/DL (ref 65–99)
HCT VFR BLD AUTO: 29.3 % (ref 37.5–51)
HGB BLD-MCNC: 10 G/DL (ref 13–17.7)
IMM GRANULOCYTES # BLD AUTO: 0.23 10*3/MM3 (ref 0–0.05)
IMM GRANULOCYTES NFR BLD AUTO: 1.3 % (ref 0–0.5)
LEFT ATRIUM VOLUME INDEX: 19.1 ML/M2
LYMPHOCYTES # BLD AUTO: 2.05 10*3/MM3 (ref 0.7–3.1)
LYMPHOCYTES NFR BLD AUTO: 11.1 % (ref 19.6–45.3)
MCH RBC QN AUTO: 28.6 PG (ref 26.6–33)
MCHC RBC AUTO-ENTMCNC: 34.1 G/DL (ref 31.5–35.7)
MCV RBC AUTO: 83.7 FL (ref 79–97)
MONOCYTES # BLD AUTO: 1.05 10*3/MM3 (ref 0.1–0.9)
MONOCYTES NFR BLD AUTO: 5.7 % (ref 5–12)
NEUTROPHILS NFR BLD AUTO: 14.8 10*3/MM3 (ref 1.7–7)
NEUTROPHILS NFR BLD AUTO: 80.5 % (ref 42.7–76)
NRBC BLD AUTO-RTO: 0 /100 WBC (ref 0–0.2)
PLATELET # BLD AUTO: 621 10*3/MM3 (ref 140–450)
PMV BLD AUTO: 10.2 FL (ref 6–12)
POTASSIUM SERPL-SCNC: 3.7 MMOL/L (ref 3.5–5.2)
PROT SERPL-MCNC: 6.4 G/DL (ref 6–8.5)
RBC # BLD AUTO: 3.5 10*6/MM3 (ref 4.14–5.8)
SODIUM SERPL-SCNC: 134 MMOL/L (ref 136–145)
VANCOMYCIN SERPL-MCNC: 13.16 MCG/ML (ref 5–40)
WBC NRBC COR # BLD AUTO: 18.39 10*3/MM3 (ref 3.4–10.8)

## 2024-04-28 PROCEDURE — 25010000002 ENOXAPARIN PER 10 MG: Performed by: STUDENT IN AN ORGANIZED HEALTH CARE EDUCATION/TRAINING PROGRAM

## 2024-04-28 PROCEDURE — 93010 ELECTROCARDIOGRAM REPORT: CPT | Performed by: INTERNAL MEDICINE

## 2024-04-28 PROCEDURE — 80053 COMPREHEN METABOLIC PANEL: CPT | Performed by: STUDENT IN AN ORGANIZED HEALTH CARE EDUCATION/TRAINING PROGRAM

## 2024-04-28 PROCEDURE — 94664 DEMO&/EVAL PT USE INHALER: CPT

## 2024-04-28 PROCEDURE — 25010000002 ONDANSETRON PER 1 MG: Performed by: STUDENT IN AN ORGANIZED HEALTH CARE EDUCATION/TRAINING PROGRAM

## 2024-04-28 PROCEDURE — 25010000002 LEVOFLOXACIN PER 250 MG: Performed by: INTERNAL MEDICINE

## 2024-04-28 PROCEDURE — 94760 N-INVAS EAR/PLS OXIMETRY 1: CPT

## 2024-04-28 PROCEDURE — 94799 UNLISTED PULMONARY SVC/PX: CPT

## 2024-04-28 PROCEDURE — 25010000002 METRONIDAZOLE 500 MG/100ML SOLUTION: Performed by: INTERNAL MEDICINE

## 2024-04-28 PROCEDURE — 25810000003 SODIUM CHLORIDE 0.9 % SOLUTION: Performed by: STUDENT IN AN ORGANIZED HEALTH CARE EDUCATION/TRAINING PROGRAM

## 2024-04-28 PROCEDURE — 93005 ELECTROCARDIOGRAM TRACING: CPT | Performed by: STUDENT IN AN ORGANIZED HEALTH CARE EDUCATION/TRAINING PROGRAM

## 2024-04-28 PROCEDURE — 80202 ASSAY OF VANCOMYCIN: CPT | Performed by: STUDENT IN AN ORGANIZED HEALTH CARE EDUCATION/TRAINING PROGRAM

## 2024-04-28 PROCEDURE — 99233 SBSQ HOSP IP/OBS HIGH 50: CPT | Performed by: INTERNAL MEDICINE

## 2024-04-28 PROCEDURE — 85025 COMPLETE CBC W/AUTO DIFF WBC: CPT | Performed by: STUDENT IN AN ORGANIZED HEALTH CARE EDUCATION/TRAINING PROGRAM

## 2024-04-28 PROCEDURE — 25010000002 VANCOMYCIN 5 G RECONSTITUTED SOLUTION: Performed by: STUDENT IN AN ORGANIZED HEALTH CARE EDUCATION/TRAINING PROGRAM

## 2024-04-28 RX ORDER — ENOXAPARIN SODIUM 100 MG/ML
30 INJECTION SUBCUTANEOUS EVERY 24 HOURS
Status: CANCELLED | OUTPATIENT
Start: 2024-04-28

## 2024-04-28 RX ORDER — GUAIFENESIN/DEXTROMETHORPHAN 100-10MG/5
5 SYRUP ORAL EVERY 4 HOURS PRN
Status: DISCONTINUED | OUTPATIENT
Start: 2024-04-28 | End: 2024-05-03 | Stop reason: HOSPADM

## 2024-04-28 RX ORDER — POTASSIUM CHLORIDE 750 MG/1
40 CAPSULE, EXTENDED RELEASE ORAL ONCE
Status: DISCONTINUED | OUTPATIENT
Start: 2024-04-28 | End: 2024-04-28

## 2024-04-28 RX ORDER — POTASSIUM CHLORIDE 750 MG/1
40 CAPSULE, EXTENDED RELEASE ORAL ONCE
Status: COMPLETED | OUTPATIENT
Start: 2024-04-28 | End: 2024-04-28

## 2024-04-28 RX ADMIN — IPRATROPIUM BROMIDE AND ALBUTEROL SULFATE 3 ML: .5; 3 SOLUTION RESPIRATORY (INHALATION) at 00:55

## 2024-04-28 RX ADMIN — SODIUM CHLORIDE 500 ML: 9 INJECTION, SOLUTION INTRAVENOUS at 04:59

## 2024-04-28 RX ADMIN — ACETAMINOPHEN 650 MG: 160 SOLUTION ORAL at 20:15

## 2024-04-28 RX ADMIN — Medication 10 ML: at 09:46

## 2024-04-28 RX ADMIN — GUAIFENESIN AND DEXTROMETHORPHAN 5 ML: 100; 10 SYRUP ORAL at 02:57

## 2024-04-28 RX ADMIN — OXCARBAZEPINE 600 MG: 300 TABLET, FILM COATED ORAL at 20:19

## 2024-04-28 RX ADMIN — POTASSIUM CHLORIDE 40 MEQ: 750 CAPSULE, EXTENDED RELEASE ORAL at 09:46

## 2024-04-28 RX ADMIN — IPRATROPIUM BROMIDE AND ALBUTEROL SULFATE 3 ML: .5; 3 SOLUTION RESPIRATORY (INHALATION) at 07:59

## 2024-04-28 RX ADMIN — GUAIFENESIN AND DEXTROMETHORPHAN 5 ML: 100; 10 SYRUP ORAL at 13:15

## 2024-04-28 RX ADMIN — PREGABALIN 150 MG: 25 CAPSULE ORAL at 09:45

## 2024-04-28 RX ADMIN — IPRATROPIUM BROMIDE AND ALBUTEROL SULFATE 3 ML: .5; 3 SOLUTION RESPIRATORY (INHALATION) at 19:56

## 2024-04-28 RX ADMIN — QUETIAPINE FUMARATE 300 MG: 200 TABLET ORAL at 20:18

## 2024-04-28 RX ADMIN — ENOXAPARIN SODIUM 30 MG: 100 INJECTION SUBCUTANEOUS at 20:16

## 2024-04-28 RX ADMIN — OXCARBAZEPINE 600 MG: 300 TABLET, FILM COATED ORAL at 09:46

## 2024-04-28 RX ADMIN — ACETAMINOPHEN 650 MG: 325 TABLET ORAL at 09:45

## 2024-04-28 RX ADMIN — PREGABALIN 150 MG: 25 CAPSULE ORAL at 14:57

## 2024-04-28 RX ADMIN — METRONIDAZOLE 500 MG: 5 INJECTION, SOLUTION INTRAVENOUS at 17:14

## 2024-04-28 RX ADMIN — PREGABALIN 150 MG: 25 CAPSULE ORAL at 20:18

## 2024-04-28 RX ADMIN — VANCOMYCIN HYDROCHLORIDE 1250 MG: 5 INJECTION, POWDER, LYOPHILIZED, FOR SOLUTION INTRAVENOUS at 20:17

## 2024-04-28 RX ADMIN — FAMOTIDINE 40 MG: 20 TABLET ORAL at 09:45

## 2024-04-28 RX ADMIN — IPRATROPIUM BROMIDE AND ALBUTEROL SULFATE 3 ML: .5; 3 SOLUTION RESPIRATORY (INHALATION) at 11:56

## 2024-04-28 RX ADMIN — METRONIDAZOLE 500 MG: 5 INJECTION, SOLUTION INTRAVENOUS at 09:45

## 2024-04-28 RX ADMIN — OXCARBAZEPINE 600 MG: 300 TABLET, FILM COATED ORAL at 14:57

## 2024-04-28 RX ADMIN — METRONIDAZOLE 500 MG: 5 INJECTION, SOLUTION INTRAVENOUS at 01:43

## 2024-04-28 RX ADMIN — BUPRENORPHINE AND NALOXONE 1 TABLET: 8; 2 TABLET SUBLINGUAL at 09:46

## 2024-04-28 RX ADMIN — VANCOMYCIN HYDROCHLORIDE 1000 MG: 5 INJECTION, POWDER, LYOPHILIZED, FOR SOLUTION INTRAVENOUS at 00:19

## 2024-04-28 RX ADMIN — VANCOMYCIN HYDROCHLORIDE 1250 MG: 5 INJECTION, POWDER, LYOPHILIZED, FOR SOLUTION INTRAVENOUS at 13:15

## 2024-04-28 RX ADMIN — ACETAMINOPHEN 650 MG: 325 TABLET ORAL at 04:45

## 2024-04-28 RX ADMIN — ONDANSETRON 4 MG: 2 INJECTION INTRAMUSCULAR; INTRAVENOUS at 13:16

## 2024-04-28 RX ADMIN — LEVOFLOXACIN 750 MG: 5 INJECTION, SOLUTION INTRAVENOUS at 17:14

## 2024-04-28 NOTE — PROGRESS NOTES
Pulmonary / Critical Care Progress Note      Patient Name: Rhett Aguirre  : 1980  MRN: [id number]  Attending:  Yasmine Hdz MD  Date of admission: 2024    Subjective   Subjective   Follow-up for necrotizing pneumonia    Over past 24 hours: Remains on Levaquin, Flagyl, and vancomycin course.  Remains on Suboxone    No acute events overnight    This morning,  Remains on room air  Sitting up in bed  Reports feeling about the same  Tmax 100.6  Reports some chest pain  Denies any chest tightness  Denies any nausea or vomiting  Diuresing  -1.9 L urine output      Objective   Objective     Vitals:   Temp:  [97.7 °F (36.5 °C)-102.9 °F (39.4 °C)] 100.6 °F (38.1 °C)  Heart Rate:  [] 106  Resp:  [14-22] 18  BP: (113-152)/(43-72) 120/65    Physical Exam   Vital Signs Reviewed   General:  WDWN, Alert, NAD.  Pleasant male, sitting up in bed  HEENT:  PERRL, EOMI.  OP, nares clear  Chest:  g good aeration with scattered rhonchi, remains on room air  CV: RRR, no MGR, pulses 2+, equal.  Abd:  Soft, NT, ND, + BS, no HSM  EXT:  no clubbing, no cyanosis, no edema  Neuro:  A&Ox3, CN grossly intact, no focal deficits.  Skin: No rashes or lesions noted      Result Review    Result Review:  I have personally reviewed the results from the time of this admission to 2024 08:30 EDT and agree with these findings:  [x]  Laboratory  [x]  Microbiology  [x]  Radiology  []  EKG/Telemetry   []  Cardiology/Vascular   []  Pathology  []  Old records  []  Other:  Most notable findings include:         Lab 24  0600 24  0453 24  1701   WBC 18.39*  --  22.21*   HEMOGLOBIN 10.0*  --  10.0*   HEMATOCRIT 29.3*  --  29.3*   PLATELETS 621*  --  598*   SODIUM 134* 140 135*   POTASSIUM 3.7 3.4* 3.5   CHLORIDE 99 104 96*   CO2 24.0 25.0 27.6   BUN 4* 9 15   CREATININE 0.68* 0.63* 0.92   GLUCOSE 109* 89 109*   CALCIUM 8.5* 7.9* 8.1*   TOTAL PROTEIN 6.4  --  6.6   ALBUMIN 2.6*  --  3.0*   GLOBULIN 3.8  --   3.6     -4/26 chest CT demonstrating a 10 cm cavitary lesion in the left lower lobe with mild mediastinal adenopathy consistent with necrotizing pneumonia    Assessment & Plan   Assessment / Plan     Active Hospital Problems:  Active Hospital Problems    Diagnosis    • **Pneumonia        Impression:  Left lower lobe necrotizing pneumonia from unspecified organism  Left lower lobe lung abscess from unspecified organism  Question aspiration and airways versus necrotizing pneumonia  Mediastinal adenopathy likely reactive  Leukocytosis  Hypocalcemia  Hypokalemia  Hyponatremia, clinically insignificant  Therapeutic drug monitoring of antibiotics  History substance abuse, heroin, marijuana, methamphetamines on Suboxone  History of tobacco abuse of cigarettes     Plan:  -Remain on room air.  Continue to maintain SpO2 greater than 90%  -Will need bronchoscopy to clear airways and assess for what organism is causing this lung abscess  -Will take for bronchoscopy with airway inspection, brushings, biopsies, bronchoalveolar lavage on Monday, 29 April 2024.  I have discussed the risks of the procedure with the patient including pneumothorax, hemothorax, bleeding, hypoxia, required mechanical ventilation and death. The patient recognizes these findings, acknowledges these findings and is agreeable to the procedure.  Dr. Parson to perform the procedure  -Fungal studies pending  -Agree with vancomycin.  MRSA PCR positive.  Patient has multiple severe beta-lactam allergy to penicillins and cephalosporins. Treatment in the setting of severe necrotizing lung and lung abscess with anaerobic coverage is Levaquin and Flagyl.  Will need a minimum of 6 weeks of treatment for lung abscess.  Day 2 of antibiotics  -Repeat noncontrast chest CT in 1 month  -Continue bronchopulmonary bronchodilator protocols.  Encourage I-S and flutter  -Encourage activity as tolerated  -Continue to trend renal panel and electrolytes.  Replace potassium  orally  -Continue antipyretics  -N.p.o. at midnight.  Sips with meds okay       DVT prophylaxis:  Medical DVT prophylaxis orders are present.    CODE STATUS:   Code Status (Patient has no pulse and is not breathing): CPR (Attempt to Resuscitate)  Medical Interventions (Patient has pulse or is breathing): Full Support      Labs, imaging, and medications personally reviewed.  Discussed with primary and patient    I, Dr. Juan Sequeira, have spent more than 50% of the total time managing the patient in this encounter today.  This included personally reviewing all pertinent labs, imaging, microbiology and documentation. Also discussing the case with the patient and any available family, the admitting physician and any available ancillary staff.    Electronically signed by TERRI Roger, 04/28/24, 10:13 AM EDT.  Electronically signed by Juan Sequeira MD, 04/28/24, 1:33 PM EDT.

## 2024-04-28 NOTE — PROGRESS NOTES
"[facility] Clinical Pharmacy Services: Vancomycin Pharmacokinetic Consult Note    Rhett Aguirre is a 43 y.o. male who is on day 3 of pharmacy to dose vancomycin for necrotizing pneumonia.    Consult Information  Consulting Provider: Andreina  Planned Duration of Therapy: 7 days  Was Patient Receiving Prior to Admission/Consult?: No  Loading Dose Ordered or Given: 1750 mg on   PK/PD Target: -600 mg/L.hr   Relevant ID History: [id number] outpatient, no recent admissions past 90 days  Other Antimicrobials: Levofloxacin, Metronidazole     Imaging Reviewed?: Yes    Microbiology Data  MRSA PCR performed: ; Result: Positive   Culture/Source:    resp: 1+ gram positive cocci pairs/cluster; gram positive rods   Blood culture: No  growth at 24 hours     Vitals/Labs  Ht: 180.3 cm (70.98\"); Wt: 88 kg (194 lb 0.1 oz)  Temp (24hrs), Av.4 °F (38 °C), Min:97.7 °F (36.5 °C), Max:102.9 °F (39.4 °C)   Estimated Creatinine Clearance: 174.3 mL/min (A) (by C-G formula based on SCr of 0.68 mg/dL (L)).  Renal: no issues at time of note     Results from last 7 days   Lab Units 24  0600 24  0453 24  1701   VANCOMYCIN RM mcg/mL 13.16  --   --    CREATININE mg/dL 0.68* 0.63* 0.92   WBC 10*3/mm3 18.39*  --  22.21*     Assessment/Plan:    Level this morning of 13.16 mcg/mL.  Will increase dose to 1250 mg every 8 hours for better predicted AUC     Vancomycin Dose:  1250 mg IV every 8 hours; which provides the following predicted parameters:  Exposure target: AUC24 (range)400-600 mg/L.hr   AUC24,ss: 533 mg/L.hr  PAUC*: 96 %  Ctrough,ss: 16 mg/L  Pconc*: 22 %  Tox.: 11 %  Vanc Random planned for  at 0600 prior to 4th dose.  Patient has order for Basic Metabolic Panel x 3d    Pharmacy will follow patient's kidney function and will adjust doses and obtain levels as necessary. Thank you for involving pharmacy in this patient's care. Please contact pharmacy with any questions or concerns.       "                     Elfego Price, [facility]  Clinical Pharmacist

## 2024-04-28 NOTE — PLAN OF CARE
Goal Outcome Evaluation:   Patient alert and oriented, on room air, sinus tachycardia on monitor, episode of nausea and vomiting today and intermittent fevers, NPO after midnight for bronchoscopy tomorrow,no other changes

## 2024-04-28 NOTE — PLAN OF CARE
Goal Outcome Evaluation:  Plan of Care Reviewed With: patient        Progress: no change  Outcome Evaluation: Pt. with productive cough, fever, tachycardia. Tylenol effective in reducing fever.  Continues to receive antibiotics and SPO2 adequate on RA

## 2024-04-28 NOTE — PROGRESS NOTES
[facility]     Progress Note    Patient Name: Rhett Aguirre  : 1980  MRN: [id number]  Primary Care Physician:  Chris Potts MD  Date of admission: 2024    Subjective     Patient was seen at the bedside today.  He was febrile and tachycardic.    Objective   Objective     Vitals:   Temp:  [97.7 °F (36.5 °C)-102.9 °F (39.4 °C)] 100.6 °F (38.1 °C)  Heart Rate:  [] 106  Resp:  [14-22] 18  BP: (113-152)/(43-72) 120/65    Physical Exam   Physical Exam    Constitutional:          Awake, alert responsive, conversant, no obvious distress              Eyes:                          PERRLA, sclerae anicteric, no conjunctival injection              HEENT:                      Moist mucous membranes, no nasal or eye discharge, no throat congestion              Neck:                          Supple, no thyromegaly, no lymphadenopathy, trachea midline, no elevated JVD              Respiratory:               Clear to auscultation bilaterally, nonlabored respirations               Cardiovascular:         RRR, no murmurs, rubs, or gallops, palpable pedal pulses bilaterally,No bilateral ankle edema              Gastrointestinal:       Positive bowel sounds, soft, nontender, nondistended, no organomegaly              Musculoskeletal:       No clubbing or cyanosis to extremities, muscle wasting, joint swelling, muscle weakness              Psychiatric:               Appropriate affect, cooperative              Neurologic:                Awake alert ,oriented x 3, strength symmetric in all extremities, Cranial Nerves grossly intact to confrontation, speech clear              Skin:                           No rashes, bruising, skin ulcers, petechiae or ecchymosis  Result Review    Result Review:  I have personally reviewed the results from the time of this admission to 2024 08:17 EDT and agree with these findings:  [x]  Laboratory  []  Microbiology  [x]  Radiology  []  EKG/Telemetry   []   Cardiology/Vascular   []  Pathology  []  Old records  []  Other:    Assessment & Plan   Assessment / Plan       Active Hospital Problems:    Active Hospital Problems    Diagnosis  POA   • **Pneumonia [J18.9]  Yes       43-year-old male chronically on Suboxone, bipolar disorder came in due to cough sore throat nausea and vomiting associated with fever in background of being treated with Z-Richie noted to have high-grade fever upon admission and x-ray showing left lower lobe consolidation and on CT scan showing dense consolidation with necrosis with mild pleural effusion.     Plan:   Urine strep and Legionella negative  Sputum cultures Gram stain is growing gram-positive rods and DNA probe is positive for MRSA  Continue Levaquin IV and vanc.  Metronidazole added by pulmonology.  Appreciate their recommendations.  Plan is for bronchoscopy on Monday.  Patient was tachycardic overnight which responded to small IV fluid bolus.  EKG showed sinus tachycardia.  We will continue to monitor.         Electronically signed by Andreina Tam MD, 04/27/24, 8:14 AM EDT.

## 2024-04-29 ENCOUNTER — APPOINTMENT (OUTPATIENT)
Dept: GENERAL RADIOLOGY | Facility: HOSPITAL | Age: 44
DRG: 193 | End: 2024-04-29
Payer: COMMERCIAL

## 2024-04-29 ENCOUNTER — ANESTHESIA EVENT (OUTPATIENT)
Dept: GASTROENTEROLOGY | Facility: HOSPITAL | Age: 44
End: 2024-04-29
Payer: COMMERCIAL

## 2024-04-29 ENCOUNTER — ANESTHESIA (OUTPATIENT)
Dept: GASTROENTEROLOGY | Facility: HOSPITAL | Age: 44
End: 2024-04-29
Payer: COMMERCIAL

## 2024-04-29 PROBLEM — J85.0 NECROTIZING PNEUMONIA: Status: ACTIVE | Noted: 2024-04-26

## 2024-04-29 PROBLEM — J85.1 ABSCESS OF LOWER LOBE OF LEFT LUNG WITH PNEUMONIA: Status: ACTIVE | Noted: 2024-04-26

## 2024-04-29 LAB
ACB CMPLX DNA BAL NAA+NON-PRB-NCNCRNG: NOT DETECTED
ANION GAP SERPL CALCULATED.3IONS-SCNC: 12.6 MMOL/L (ref 5–15)
BACTERIA SPEC RESP CULT: NORMAL
BASOPHILS # BLD AUTO: 0.06 10*3/MM3 (ref 0–0.2)
BASOPHILS NFR BLD AUTO: 0.3 % (ref 0–1.5)
BLACTX-M ISLT/SPM QL: NORMAL
BLAIMP ISLT/SPM QL: NORMAL
BLAKPC ISLT/SPM QL: NORMAL
BLAOXA-48-LIKE ISLT/SPM QL: NORMAL
BLAVIM ISLT/SPM QL: NORMAL
BUN SERPL-MCNC: 6 MG/DL (ref 6–20)
BUN/CREAT SERPL: 8.3 (ref 7–25)
C PNEUM DNA NPH QL NAA+NON-PROBE: NOT DETECTED
CALCIUM SPEC-SCNC: 8.4 MG/DL (ref 8.6–10.5)
CHLORIDE SERPL-SCNC: 100 MMOL/L (ref 98–107)
CO2 SERPL-SCNC: 23.4 MMOL/L (ref 22–29)
CREAT SERPL-MCNC: 0.72 MG/DL (ref 0.76–1.27)
DEPRECATED RDW RBC AUTO: 49.1 FL (ref 37–54)
E CLOAC COMP DNA BAL NAA+NON-PRB-NCNCRNG: NOT DETECTED
E COLI DNA BAL NAA+NON-PRB-NCNCRNG: NOT DETECTED
EGFRCR SERPLBLD CKD-EPI 2021: 116.3 ML/MIN/1.73
EOSINOPHIL # BLD AUTO: 0.24 10*3/MM3 (ref 0–0.4)
EOSINOPHIL NFR BLD AUTO: 1.4 % (ref 0.3–6.2)
EOSINOPHIL NFR FLD MANUAL: 1 %
ERYTHROCYTE [DISTWIDTH] IN BLOOD BY AUTOMATED COUNT: 15.5 % (ref 12.3–15.4)
FLUAV SUBTYP SPEC NAA+PROBE: NOT DETECTED
FLUBV RNA ISLT QL NAA+PROBE: NOT DETECTED
GLUCOSE SERPL-MCNC: 93 MG/DL (ref 65–99)
GP B STREP DNA BAL NAA+NON-PRB-NCNCRNG: NOT DETECTED
GRAM STN SPEC: NORMAL
HADV DNA SPEC NAA+PROBE: NOT DETECTED
HAEM INFLU DNA BAL NAA+NON-PRB-NCNCRNG: NOT DETECTED
HCOV RNA LOWER RESP QL NAA+NON-PROBE: NOT DETECTED
HCT VFR BLD AUTO: 30 % (ref 37.5–51)
HGB BLD-MCNC: 9.9 G/DL (ref 13–17.7)
HMPV RNA NPH QL NAA+NON-PROBE: NOT DETECTED
HPIV RNA LOWER RESP QL NAA+NON-PROBE: NOT DETECTED
IMM GRANULOCYTES # BLD AUTO: 0.24 10*3/MM3 (ref 0–0.05)
IMM GRANULOCYTES NFR BLD AUTO: 1.4 % (ref 0–0.5)
IRON 24H UR-MRATE: 22 MCG/DL (ref 59–158)
IRON SATN MFR SERPL: 13 % (ref 20–50)
K AEROGENES DNA BAL NAA+NON-PRB-NCNCRNG: NOT DETECTED
K OXYTOCA DNA BAL NAA+NON-PRB-NCNCRNG: NOT DETECTED
K PNEU GRP DNA BAL NAA+NON-PRB-NCNCRNG: NOT DETECTED
L PNEUMO DNA LOWER RESP QL NAA+NON-PROBE: NOT DETECTED
LYMPHOCYTES # BLD AUTO: 2.07 10*3/MM3 (ref 0.7–3.1)
LYMPHOCYTES NFR BLD AUTO: 11.9 % (ref 19.6–45.3)
LYMPHOCYTES NFR FLD MANUAL: 2 %
M CATARRHALIS DNA BAL NAA+NON-PRB-NCNCRNG: NOT DETECTED
M PNEUMO IGG SER IA-ACNC: NOT DETECTED
MACROPHAGE FLUID: 6 %
MCH RBC QN AUTO: 28.2 PG (ref 26.6–33)
MCHC RBC AUTO-ENTMCNC: 33 G/DL (ref 31.5–35.7)
MCV RBC AUTO: 85.5 FL (ref 79–97)
MECA+MECC ISLT/SPM QL: NORMAL
MONOCYTES # BLD AUTO: 0.84 10*3/MM3 (ref 0.1–0.9)
MONOCYTES NFR BLD AUTO: 4.8 % (ref 5–12)
NDM GENE: NORMAL
NEUTROPHILS NFR BLD AUTO: 13.9 10*3/MM3 (ref 1.7–7)
NEUTROPHILS NFR BLD AUTO: 80.2 % (ref 42.7–76)
NEUTROPHILS NFR FLD MANUAL: 91 %
NRBC BLD AUTO-RTO: 0 /100 WBC (ref 0–0.2)
P AERUGINOSA DNA BAL NAA+NON-PRB-NCNCRNG: NOT DETECTED
PLATELET # BLD AUTO: 656 10*3/MM3 (ref 140–450)
PMV BLD AUTO: 10.5 FL (ref 6–12)
POTASSIUM SERPL-SCNC: 4.1 MMOL/L (ref 3.5–5.2)
PROTEUS SP DNA BAL NAA+NON-PRB-NCNCRNG: NOT DETECTED
RBC # BLD AUTO: 3.51 10*6/MM3 (ref 4.14–5.8)
RHINOVIRUS RNA SPEC NAA+PROBE: NOT DETECTED
RSV RNA NPH QL NAA+NON-PROBE: NOT DETECTED
S AUREUS DNA BAL NAA+NON-PRB-NCNCRNG: NOT DETECTED
S MARCESCENS DNA BAL NAA+NON-PRB-NCNCRNG: NOT DETECTED
S PNEUM DNA BAL NAA+NON-PRB-NCNCRNG: NOT DETECTED
S PYO DNA BAL NAA+NON-PRB-NCNCRNG: NOT DETECTED
SODIUM SERPL-SCNC: 136 MMOL/L (ref 136–145)
TIBC SERPL-MCNC: 168 MCG/DL (ref 298–536)
TRANSFERRIN SERPL-MCNC: 113 MG/DL (ref 200–360)
VISUAL PRESENCE OF BLOOD: NORMAL
VIT B12 BLD-MCNC: 1219 PG/ML (ref 211–946)
WBC NRBC COR # BLD AUTO: 17.35 10*3/MM3 (ref 3.4–10.8)

## 2024-04-29 PROCEDURE — 25010000002 PROPOFOL 10 MG/ML EMULSION

## 2024-04-29 PROCEDURE — 87102 FUNGUS ISOLATION CULTURE: CPT | Performed by: INTERNAL MEDICINE

## 2024-04-29 PROCEDURE — 25810000003 LACTATED RINGERS PER 1000 ML

## 2024-04-29 PROCEDURE — 84466 ASSAY OF TRANSFERRIN: CPT | Performed by: INTERNAL MEDICINE

## 2024-04-29 PROCEDURE — 25810000003 SODIUM CHLORIDE 0.9 % SOLUTION: Performed by: STUDENT IN AN ORGANIZED HEALTH CARE EDUCATION/TRAINING PROGRAM

## 2024-04-29 PROCEDURE — 87206 SMEAR FLUORESCENT/ACID STAI: CPT | Performed by: INTERNAL MEDICINE

## 2024-04-29 PROCEDURE — 31628 BRONCHOSCOPY/LUNG BX EACH: CPT | Performed by: INTERNAL MEDICINE

## 2024-04-29 PROCEDURE — 25010000002 VANCOMYCIN 5 G RECONSTITUTED SOLUTION: Performed by: INTERNAL MEDICINE

## 2024-04-29 PROCEDURE — 85025 COMPLETE CBC W/AUTO DIFF WBC: CPT | Performed by: STUDENT IN AN ORGANIZED HEALTH CARE EDUCATION/TRAINING PROGRAM

## 2024-04-29 PROCEDURE — 87116 MYCOBACTERIA CULTURE: CPT | Performed by: INTERNAL MEDICINE

## 2024-04-29 PROCEDURE — 94664 DEMO&/EVAL PT USE INHALER: CPT

## 2024-04-29 PROCEDURE — 25010000002 VANCOMYCIN 5 G RECONSTITUTED SOLUTION: Performed by: STUDENT IN AN ORGANIZED HEALTH CARE EDUCATION/TRAINING PROGRAM

## 2024-04-29 PROCEDURE — 31624 DX BRONCHOSCOPE/LAVAGE: CPT | Performed by: INTERNAL MEDICINE

## 2024-04-29 PROCEDURE — 31623 DX BRONCHOSCOPE/BRUSH: CPT | Performed by: INTERNAL MEDICINE

## 2024-04-29 PROCEDURE — 25010000002 ENOXAPARIN PER 10 MG: Performed by: INTERNAL MEDICINE

## 2024-04-29 PROCEDURE — 94799 UNLISTED PULMONARY SVC/PX: CPT

## 2024-04-29 PROCEDURE — 0BDB8ZX EXTRACTION OF LEFT LOWER LOBE BRONCHUS, VIA NATURAL OR ARTIFICIAL OPENING ENDOSCOPIC, DIAGNOSTIC: ICD-10-PCS | Performed by: INTERNAL MEDICINE

## 2024-04-29 PROCEDURE — 87633 RESP VIRUS 12-25 TARGETS: CPT | Performed by: INTERNAL MEDICINE

## 2024-04-29 PROCEDURE — 31645 BRNCHSC W/THER ASPIR 1ST: CPT | Performed by: INTERNAL MEDICINE

## 2024-04-29 PROCEDURE — 87071 CULTURE AEROBIC QUANT OTHER: CPT | Performed by: INTERNAL MEDICINE

## 2024-04-29 PROCEDURE — 25010000002 METRONIDAZOLE 500 MG/100ML SOLUTION: Performed by: INTERNAL MEDICINE

## 2024-04-29 PROCEDURE — 88104 CYTOPATH FL NONGYN SMEARS: CPT | Performed by: INTERNAL MEDICINE

## 2024-04-29 PROCEDURE — 87205 SMEAR GRAM STAIN: CPT | Performed by: INTERNAL MEDICINE

## 2024-04-29 PROCEDURE — 87070 CULTURE OTHR SPECIMN AEROBIC: CPT | Performed by: INTERNAL MEDICINE

## 2024-04-29 PROCEDURE — 25810000003 LACTATED RINGERS PER 1000 ML: Performed by: INTERNAL MEDICINE

## 2024-04-29 PROCEDURE — 25010000002 LEVOFLOXACIN PER 250 MG: Performed by: INTERNAL MEDICINE

## 2024-04-29 PROCEDURE — 89051 BODY FLUID CELL COUNT: CPT | Performed by: INTERNAL MEDICINE

## 2024-04-29 PROCEDURE — 99233 SBSQ HOSP IP/OBS HIGH 50: CPT | Performed by: INTERNAL MEDICINE

## 2024-04-29 PROCEDURE — 88305 TISSUE EXAM BY PATHOLOGIST: CPT | Performed by: INTERNAL MEDICINE

## 2024-04-29 PROCEDURE — 25810000003 SODIUM CHLORIDE 0.9 % SOLUTION: Performed by: INTERNAL MEDICINE

## 2024-04-29 PROCEDURE — 80048 BASIC METABOLIC PNL TOTAL CA: CPT | Performed by: STUDENT IN AN ORGANIZED HEALTH CARE EDUCATION/TRAINING PROGRAM

## 2024-04-29 PROCEDURE — 83540 ASSAY OF IRON: CPT | Performed by: INTERNAL MEDICINE

## 2024-04-29 PROCEDURE — 0B9B8ZX DRAINAGE OF LEFT LOWER LOBE BRONCHUS, VIA NATURAL OR ARTIFICIAL OPENING ENDOSCOPIC, DIAGNOSTIC: ICD-10-PCS | Performed by: INTERNAL MEDICINE

## 2024-04-29 PROCEDURE — 0B9J8ZX DRAINAGE OF LEFT LOWER LUNG LOBE, VIA NATURAL OR ARTIFICIAL OPENING ENDOSCOPIC, DIAGNOSTIC: ICD-10-PCS | Performed by: INTERNAL MEDICINE

## 2024-04-29 PROCEDURE — 88108 CYTOPATH CONCENTRATE TECH: CPT | Performed by: INTERNAL MEDICINE

## 2024-04-29 PROCEDURE — 71045 X-RAY EXAM CHEST 1 VIEW: CPT

## 2024-04-29 RX ORDER — IPRATROPIUM BROMIDE AND ALBUTEROL SULFATE 2.5; .5 MG/3ML; MG/3ML
3 SOLUTION RESPIRATORY (INHALATION) ONCE AS NEEDED
Status: COMPLETED | OUTPATIENT
Start: 2024-04-29 | End: 2024-04-29

## 2024-04-29 RX ORDER — LIDOCAINE HYDROCHLORIDE 40 MG/ML
INJECTION, SOLUTION RETROBULBAR AS NEEDED
Status: DISCONTINUED | OUTPATIENT
Start: 2024-04-29 | End: 2024-04-29 | Stop reason: HOSPADM

## 2024-04-29 RX ORDER — SODIUM CHLORIDE, SODIUM LACTATE, POTASSIUM CHLORIDE, CALCIUM CHLORIDE 600; 310; 30; 20 MG/100ML; MG/100ML; MG/100ML; MG/100ML
30 INJECTION, SOLUTION INTRAVENOUS CONTINUOUS
Status: DISCONTINUED | OUTPATIENT
Start: 2024-04-29 | End: 2024-05-03 | Stop reason: HOSPADM

## 2024-04-29 RX ORDER — BENZONATATE 100 MG/1
200 CAPSULE ORAL 3 TIMES DAILY
Status: DISCONTINUED | OUTPATIENT
Start: 2024-04-29 | End: 2024-05-03 | Stop reason: HOSPADM

## 2024-04-29 RX ORDER — PROPOFOL 10 MG/ML
VIAL (ML) INTRAVENOUS AS NEEDED
Status: DISCONTINUED | OUTPATIENT
Start: 2024-04-29 | End: 2024-04-29 | Stop reason: SURG

## 2024-04-29 RX ADMIN — ACETAMINOPHEN 650 MG: 160 SOLUTION ORAL at 11:39

## 2024-04-29 RX ADMIN — IPRATROPIUM BROMIDE AND ALBUTEROL SULFATE 3 ML: .5; 3 SOLUTION RESPIRATORY (INHALATION) at 09:05

## 2024-04-29 RX ADMIN — PROPOFOL 100 MG: 10 INJECTION, EMULSION INTRAVENOUS at 14:02

## 2024-04-29 RX ADMIN — IPRATROPIUM BROMIDE AND ALBUTEROL SULFATE 3 ML: .5; 3 SOLUTION RESPIRATORY (INHALATION) at 21:09

## 2024-04-29 RX ADMIN — METRONIDAZOLE 500 MG: 5 INJECTION, SOLUTION INTRAVENOUS at 09:45

## 2024-04-29 RX ADMIN — QUETIAPINE FUMARATE 300 MG: 200 TABLET ORAL at 20:31

## 2024-04-29 RX ADMIN — GUAIFENESIN AND DEXTROMETHORPHAN 5 ML: 100; 10 SYRUP ORAL at 15:41

## 2024-04-29 RX ADMIN — FAMOTIDINE 40 MG: 20 TABLET ORAL at 09:46

## 2024-04-29 RX ADMIN — PREGABALIN 150 MG: 25 CAPSULE ORAL at 09:45

## 2024-04-29 RX ADMIN — METRONIDAZOLE 500 MG: 5 INJECTION, SOLUTION INTRAVENOUS at 17:45

## 2024-04-29 RX ADMIN — IPRATROPIUM BROMIDE AND ALBUTEROL SULFATE 3 ML: .5; 3 SOLUTION RESPIRATORY (INHALATION) at 00:42

## 2024-04-29 RX ADMIN — SODIUM CHLORIDE, POTASSIUM CHLORIDE, SODIUM LACTATE AND CALCIUM CHLORIDE 30 ML/HR: 600; 310; 30; 20 INJECTION, SOLUTION INTRAVENOUS at 17:45

## 2024-04-29 RX ADMIN — GUAIFENESIN AND DEXTROMETHORPHAN 5 ML: 100; 10 SYRUP ORAL at 11:39

## 2024-04-29 RX ADMIN — BUPRENORPHINE AND NALOXONE 1 TABLET: 8; 2 TABLET SUBLINGUAL at 09:46

## 2024-04-29 RX ADMIN — VANCOMYCIN HYDROCHLORIDE 1250 MG: 5 INJECTION, POWDER, LYOPHILIZED, FOR SOLUTION INTRAVENOUS at 15:49

## 2024-04-29 RX ADMIN — VANCOMYCIN HYDROCHLORIDE 1250 MG: 5 INJECTION, POWDER, LYOPHILIZED, FOR SOLUTION INTRAVENOUS at 05:19

## 2024-04-29 RX ADMIN — METRONIDAZOLE 500 MG: 5 INJECTION, SOLUTION INTRAVENOUS at 01:19

## 2024-04-29 RX ADMIN — VANCOMYCIN HYDROCHLORIDE 1250 MG: 5 INJECTION, POWDER, LYOPHILIZED, FOR SOLUTION INTRAVENOUS at 22:09

## 2024-04-29 RX ADMIN — OXCARBAZEPINE 600 MG: 300 TABLET, FILM COATED ORAL at 15:49

## 2024-04-29 RX ADMIN — IPRATROPIUM BROMIDE AND ALBUTEROL SULFATE 3 ML: .5; 3 SOLUTION RESPIRATORY (INHALATION) at 12:18

## 2024-04-29 RX ADMIN — BENZONATATE 200 MG: 100 CAPSULE ORAL at 20:33

## 2024-04-29 RX ADMIN — GUAIFENESIN AND DEXTROMETHORPHAN 5 ML: 100; 10 SYRUP ORAL at 07:10

## 2024-04-29 RX ADMIN — GUAIFENESIN AND DEXTROMETHORPHAN 5 ML: 100; 10 SYRUP ORAL at 20:30

## 2024-04-29 RX ADMIN — OXCARBAZEPINE 600 MG: 300 TABLET, FILM COATED ORAL at 20:30

## 2024-04-29 RX ADMIN — OXCARBAZEPINE 600 MG: 300 TABLET, FILM COATED ORAL at 09:45

## 2024-04-29 RX ADMIN — Medication 10 ML: at 09:46

## 2024-04-29 RX ADMIN — IPRATROPIUM BROMIDE AND ALBUTEROL SULFATE 3 ML: .5; 3 SOLUTION RESPIRATORY (INHALATION) at 16:44

## 2024-04-29 RX ADMIN — ENOXAPARIN SODIUM 30 MG: 100 INJECTION SUBCUTANEOUS at 20:29

## 2024-04-29 RX ADMIN — ACETAMINOPHEN 650 MG: 160 SOLUTION ORAL at 07:10

## 2024-04-29 RX ADMIN — SODIUM CHLORIDE, POTASSIUM CHLORIDE, SODIUM LACTATE AND CALCIUM CHLORIDE: 600; 310; 30; 20 INJECTION, SOLUTION INTRAVENOUS at 14:18

## 2024-04-29 RX ADMIN — PROPOFOL 250 MCG/KG/MIN: 10 INJECTION, EMULSION INTRAVENOUS at 14:02

## 2024-04-29 RX ADMIN — PREGABALIN 150 MG: 25 CAPSULE ORAL at 15:48

## 2024-04-29 RX ADMIN — PREGABALIN 150 MG: 25 CAPSULE ORAL at 20:30

## 2024-04-29 RX ADMIN — GUAIFENESIN AND DEXTROMETHORPHAN 5 ML: 100; 10 SYRUP ORAL at 02:11

## 2024-04-29 RX ADMIN — LEVOFLOXACIN 750 MG: 5 INJECTION, SOLUTION INTRAVENOUS at 17:45

## 2024-04-29 RX ADMIN — ACETAMINOPHEN 650 MG: 325 TABLET ORAL at 18:26

## 2024-04-29 NOTE — NURSING NOTE
Patient Aa0x4, Patient rested off/on throughout shift, Pt has a strong nonproductive cough, spiked a fever of 102.4 received dose of tylenol that was effective in decreasing temp- see emar Dr. Tam notified. Patient received medication for cough - see emar.

## 2024-04-29 NOTE — OP NOTE
Bronchoscopy Procedure Note    Procedure:  Bronchoscopy with bronchoalveolar lavage left lower lobe  Bronchoscopy with bronchial brushing left lower lobe  Bronchoscopy with transbronchial biopsies left lower lobe  Bronchoscopy with bronchial washings tracheobronchial tree  Mucous plug removal  Airway inspection    Pre-Operative Diagnosis: Left lower lobe cavitary pneumonia  Post-Operative Diagnosis: Significant mucous plugging, left lower lobe cavitary pneumonia, indurated airway    Indication:  Left lower lobe cavitary pneumonia    Anesthesia: MAC anesthesia    Procedure Details: Patient was consented for the procedure with all risks and benefits of the procedure explained in detail. Patient was given the opportunity to ask questions and all concerns were answered.    The bronchoscope was inserted into the main airway via the mouth. An anatomical survey was done of the main airways and the subsegmental bronchus. The findings are reported below.  Significant mucus plugging was seen bilaterally, mostly in the left lower lobe, suctioned out in several attempts.  A bronchoalveolar lavage was performed using 60 mL aliquots of normal saline x 2 instilled into the airways then aspirated back from left lower lobe of lung with 40 mL serosanguineous fluid in return. Then bronchial brushing was obtained from left lower lobe, lateral basal segment with 2 passes. Blind transbronchial biopsies were done from left lower lobe, lateral basal segment with several passes blindly. Patient had mild oozing after the biopsy.  Bronchial washing was obtained from entire tracheobronchial tree.    Findings:  Significant mucus plugging was seen bilaterally, mostly in the left lower lobe, suctioned out in several attempts.  Friable mucosa, easy bleeding with suction  Scattered purulent secretions    Estimated Blood Loss: Insignificant    Specimens:  BAL left lower lobe  Bronchial brushing lower lobe  Transbronchial biopsies left lower  lobe  Bronchial washings tracheobronchial tree    Complications: None; patient tolerated the procedure well.    Disposition: To [facility], once stable in recovery.    Patient tolerated the procedure well.      Electronically signed by Renard Parson MD, 4/29/2024, 14:18 EDT.

## 2024-04-29 NOTE — PROGRESS NOTES
"[facility] Clinical [facility]acy Services: Vancomycin Monitoring Note    Rhett Aguirre is a 43 y.o. male who is on day  of pharmacy to dose vancomycin for  necrotizing pneumonia .    Previous Vancomycin Dose:   1250 mg IV every  8  hours  Imaging Reviewed?: Yes   CTC: 10 cm cavitary lesion in the left lower lobe with large air-fluid; patchy alveolar airspace disease is seen in the lungs bilaterally - findings consistent with necrotizing PNA; diff dx includes TB, fungal infection, and necrotic neoplasm    Updated Cultures and Sensitivities:    MRSA PCR: positive   S. Pneumo/legionella: negative   Resp cx: rare normal respiratory kd    Blood cx : NGTD    COVID/flu/RSV: negative  --bronch scheduled for     Vitals/Labs  Ht: 180.3 cm (70.98\"); Wt: 91.7 kg (202 lb 2.6 oz)   Temp (24hrs), Av.8 °F (37.7 °C), Min:98.1 °F (36.7 °C), Max:102.4 °F (39.1 °C)   Estimated Creatinine Clearance: 153.2 mL/min (A) (by C-G formula based on SCr of 0.72 mg/dL (L)).     Results from last 7 days   Lab Units 24  0534 24  0600 24  0453 24  1701   VANCOMYCIN RM mcg/mL  --  13.16  --   --    CREATININE mg/dL 0.72* 0.68* 0.63* 0.92   WBC 10*3/mm3 17.35* 18.39*  --  22.21*     Assessment/Plan    Current Vancomycin Dose:  1250 mg IV every 8 hours; which provides the following predicted parameters:  AUC24,ss: 533 mg/L.hr  PAUC*: 96 %  Ctrough,ss: 16.3 mg/L  Pconc*: 23 %  Tox.: 12 %  Next Vanc Random planned for prior to 4th or 5th dose   We will continue to monitor patient changes and renal function     Thank you for involving pharmacy in this patient's care. Please contact pharmacy with any questions or concerns.    Ivett Falk RPH  Clinical Pharmacist  "

## 2024-04-29 NOTE — ANESTHESIA PREPROCEDURE EVALUATION
Anesthesia Evaluation     Patient summary reviewed and Nursing notes reviewed   NPO Solid Status: > 8 hours  NPO Liquid Status: > 8 hours           Airway   Mallampati: II  TM distance: >3 FB  Neck ROM: full  No difficulty expected  Dental    (+) edentulous    Pulmonary     breath sounds clear to auscultation  (+) pneumonia , a smoker (quit 9 days ago), asthma,  Cardiovascular   Exercise tolerance: poor (<4 METS)    ECG reviewed  Rhythm: regular  Rate: abnormal    (+) hypertension well controlled, angina, hyperlipidemia    PE comment: Sinus tach on monitor -      Neuro/Psych  (+) headaches, psychiatric history Anxiety, Depression and Bipolar  GI/Hepatic/Renal/Endo    (+) renal disease- stones    Musculoskeletal     Abdominal    Substance History      OB/GYN          Other   arthritis,     ROS/Med Hx Other: Ct chest 04/26/24:   Impression:  CT scan of the chest without IV contrast demonstrating 10 cm cavitary lesion in the left lower lobe with large air-fluid level suggesting communication with the tracheobronchial tree. Patchy alveolar airspace disease is seen in the lungs bilaterally.     Mild mediastinal adenopathy.     Findings are consistent with necrotizing pneumonia. The differential diagnosis includes tuberculosis, fungal infection, and necrotic neoplasm.    Pt in isolation for MRSA of nares     EKG 04/28/24: ,   Sinus tachycardia  Probable left atrial enlargement  RSR' in V1 or V2, right VCD or RVH    ECHO 04/27/24:     Left ventricular systolic function is normal. Calculated left ventricular EF = 63.3%  ·  Left ventricular diastolic function was normal.    Labs:   04/29/24 05:34  RBC: 3.51 (L)  Hemoglobin: 9.9 (L)  Hematocrit: 30.0 (L)  Platelets: 656 (H)  RDW: 15.5 (H)  MCV: 85.5  MCH: 28.2  MCHC: 33.0  MPV: 10.5  RDW-SD: 49.1  Neutrophil Rel %: 80.2 (H)  Lymphocyte Rel %: 11.9 (L)  Monocyte Rel %: 4.8 (L)  04/29/24 05:34  WBC: 17.35 (H)  RBC: 3.51 (L)  Hemoglobin: 9.9 (L)  Hematocrit: 30.0  (L)  Platelets: 656 (H)  RDW: 15.5 (H)          Phys Exam Other: 92% on room air     Temp 100 orally in preop               Anesthesia Plan    ASA 3     general   total IV anesthesia  intravenous induction     Anesthetic plan, risks, benefits, and alternatives have been provided, discussed and informed consent has been obtained with: patient.  Pre-procedure education provided  Plan discussed with CRNA.      CODE STATUS:    Code Status (Patient has no pulse and is not breathing): CPR (Attempt to Resuscitate)  Medical Interventions (Patient has pulse or is breathing): Full Support

## 2024-04-29 NOTE — PROGRESS NOTES
Respiratory Therapist Broncho-Pulmonary Hygiene Progress Note      Patient Name:  Rhett Aguirre  YOB: 1980    Rhett Aguirre meets the qualification for Level 2 of the Bronco-Pulmonary Hygiene Protocol. This was based on my daily patient assessment and includes review of chest x-ray results, cough ability and quality, oxygenation, secretions or risk for secretion development and patient mobility.     Broncho-Pulmonary Hygiene Assessment:    Level of Movement: Actively changing positions without assistance  Alert/ oriented/ cooperative    Breath Sounds: Clear to slightly diminished    Cough: Strong, effective    Chest X-Ray: Mild consolidation and/or atelectasis.    Sputum Productions: None or small amount of thin or watery secretions with effective cough    History and Physical: New onset of bronchitis or existing chronic pulmonary conditions.  **(not in an exacerbation)    SpO2 to Oxygen Need: greater than 92% on room air or  less than 3L nasal canula    Current SpO2 is: 94 on Room air    Based on this information, I have completed the following interventions: Aerobika with bronchodialtor medication or TID      Electronically signed by Betsy Nunez RRT, 04/29/24, 10:19 AM EDT.

## 2024-04-29 NOTE — PROGRESS NOTES
[facility]     Progress Note    Patient Name: Rhett Aguirre  : 1980  MRN: [id number]  Primary Care Physician:  Chris Potts MD  Date of admission: 2024    Subjective  patient is feeling well he is still febrile but feels weak tired   Vital signs are stable  Review of Systems  All review of systems are negative except as mentioned in subjective complaints.    Objective   Objective     Vitals:   Temp:  [98.1 °F (36.7 °C)-102.4 °F (39.1 °C)] 100.1 °F (37.8 °C)  Heart Rate:  [] 94  Resp:  [16-18] 16  BP: (103-138)/(50-70) 132/56  Physical Exam    Constitutional: Awake, alert responsive, conversant, no obvious distress              Psychiatric:  Appropriate affect, cooperative   Neurologic:  Awake alert ,oriented x 3, strength symmetric in all extremities, Cranial Nerves grossly intact to confrontation, speech clear   Eyes:   PERRLA, sclerae anicteric, no conjunctival injection   HEENT:  Moist mucous membranes, no nasal or eye discharge, no throat congestion   Neck:   Supple, no thyromegaly, no lymphadenopathy, trachea midline, no elevated JVD   Respiratory:  Clear to auscultation bilaterally, nonlabored respirations    Cardiovascular: RRR, no murmurs, rubs, or gallops, palpable pedal pulses bilaterally, No bilateral ankle edema   Gastrointestinal: Positive bowel sounds, soft, nontender, nondistended, no organomegaly   Musculoskeletal:  No clubbing or cyanosis to extremities,muscle wasting, joint swelling, muscle weakness             Skin:                      No rashes, bruising, skin ulcers, petechiae or ecchymosis    Result Review    Result Review:  I have personally reviewed the results from the time of this admission to 2024 13:16 EDT and agree with these findings:  []  Laboratory  []  Microbiology  []  Radiology  []  EKG/Telemetry   []  Cardiology/Vascular   []  Pathology  []  Old records  []  Other:    Results from last 7 days   Lab Units 24  1106  04/28/24  0600 04/26/24  1701   WBC 10*3/mm3 17.35* 18.39* 22.21*   HEMOGLOBIN g/dL 9.9* 10.0* 10.0*   PLATELETS 10*3/mm3 656* 621* 598*     Results from last 7 days   Lab Units 04/29/24  0534 04/28/24  0600 04/27/24  0453 04/26/24  1701   SODIUM mmol/L 136 134* 140 135*   POTASSIUM mmol/L 4.1 3.7 3.4* 3.5   CHLORIDE mmol/L 100 99 104 96*   CO2 mmol/L 23.4 24.0 25.0 27.6   ANION GAP mmol/L 12.6 11.0 11.0 11.4   BUN mg/dL 6 4* 9 15   CREATININE mg/dL 0.72* 0.68* 0.63* 0.92   GLUCOSE mg/dL 93 109* 89 109*       Assessment & Plan   Assessment / Plan       Active Hospital Problems:    Active Hospital Problems    Diagnosis  POA   • **Necrotizing pneumonia [J85.0]  Yes       Plan:    Continue IV antibiotics   Check iron profile and vitamin B12    Pulmonary service is following with the patient    Electronically signed by Junaid Wu MD, 04/29/24, 1:14 PM EDT.

## 2024-04-29 NOTE — ANESTHESIA POSTPROCEDURE EVALUATION
Patient: Rhett Aguirre    Procedure Summary       Date: 04/29/24 Room / Location: [facility] END[facility] 3 / [facility]    Anesthesia Start: 1359 Anesthesia Stop: 1432    Procedure: BRONCHOSCOPY WITH BAL, BIOPSIES, BRUSHINGS, AND WASHINGS (Bronchus) Diagnosis:       Abscess of lower lobe of left lung with pneumonia      (Abscess of lower lobe of left lung with pneumonia [J85.1])    Surgeons: Renard Parson MD Provider: Manolo Klein CRNA    Anesthesia Type: general ASA Status: 3            Anesthesia Type: general    Vitals  Vitals Value Taken Time   /77 04/29/24 1459   Temp 36.3 °C (97.3 °F) 04/29/24 1459   Pulse 126 04/29/24 1501   Resp 18 04/29/24 1459   SpO2 90 % 04/29/24 1501   Vitals shown include unfiled device data.        Post Anesthesia Care and Evaluation    Post-procedure mental status: acceptable.  Pain management: satisfactory to patient    Airway patency: patent  Anesthetic complications: No anesthetic complications    Cardiovascular status: acceptable  Respiratory status: acceptable    Comments: Per chart review

## 2024-04-29 NOTE — PLAN OF CARE
Goal Outcome Evaluation:              Outcome Evaluation: Patient AOx4. Patient has been febrile throughout the day, treated with Tylenol. Bronchoscopy today, patient returned with uncontrollable coughing, MD notified, see orders. Isolation precautions maintained, continuing plan of care.

## 2024-04-29 NOTE — PLAN OF CARE
Problem: Adult Inpatient Plan of Care  Goal: Plan of Care Review  Outcome: Ongoing, Progressing     Problem: Fall Injury Risk  Goal: Absence of Fall and Fall-Related Injury  Outcome: Ongoing, Progressing  Intervention: Identify and Manage Contributors  Recent Flowsheet Documentation  Taken 4/28/2024 2200 by Yudi Lr, RN  Medication Review/Management: medications reviewed  Taken 4/28/2024 2018 by Yudi Lr, RN  Medication Review/Management: medications reviewed  Intervention: Promote Injury-Free Environment  Recent Flowsheet Documentation  Taken 4/28/2024 2200 by Yudi Lr, RN  Safety Promotion/Fall Prevention: safety round/check completed  Taken 4/28/2024 1946 by Yudi Lr, RN  Safety Promotion/Fall Prevention: safety round/check completed   Goal Outcome Evaluation:

## 2024-04-29 NOTE — PROGRESS NOTES
Pulmonary / Critical Care Progress Note      Patient Name: Rhett Aguirre  : 1980  MRN: [id number]  Attending:  Yasmine Hdz MD  Date of admission: 2024    Subjective   Subjective   Follow-up for necrotizing pneumonia    Over past 24 hours: Remained on Levaquin, Flagyl, and vancomycin course.  Remains on Suboxone    No acute events overnight    This morning,  Remains on room air  Sitting up in bed  Reports feeling about the same  Tmax 101.1 °F.  Reports some chest pain  Denies any chest tightness  Denies any nausea or vomiting  Diuresing  Was negative balance 3.5 L over last 24 hours.      Objective   Objective     Vitals:   Temp:  [98.1 °F (36.7 °C)-102.4 °F (39.1 °C)] 98.8 °F (37.1 °C)  Heart Rate:  [] 109  Resp:  [16-18] 16  BP: (103-117)/(50-70) 114/60    Physical Exam   Vital Signs Reviewed   General:  WDWN, Alert, in no acute distress.  Pleasant male, sitting up in bed  HEENT:  PERRL, EOMI.  OP, nares clear  Chest:  g good aeration with scattered rhonchi, remains on room air  CV: RRR, no MGR, pulses 2+, equal.  Abd:  Soft, NT, ND, + BS, no HSM  EXT:  no clubbing, no cyanosis, no edema  Neuro:  A&Ox3, CN grossly intact, no focal deficits.  Skin: No rashes or lesions noted      Result Review    Result Review:  I have personally reviewed the results from the time of this admission to 2024 07:42 EDT and agree with these findings:  [x]  Laboratory  [x]  Microbiology  [x]  Radiology  []  EKG/Telemetry   []  Cardiology/Vascular   []  Pathology  []  Old records  []  Other:  Most notable findings include:         Lab 24  0534 24  0600 24  0453 24  1701   WBC 17.35* 18.39*  --  22.21*   HEMOGLOBIN 9.9* 10.0*  --  10.0*   HEMATOCRIT 30.0* 29.3*  --  29.3*   PLATELETS 656* 621*  --  598*   SODIUM 136 134* 140 135*   POTASSIUM 4.1 3.7 3.4* 3.5   CHLORIDE 100 99 104 96*   CO2 23.4 24.0 25.0 27.6   BUN 6 4* 9 15   CREATININE 0.72* 0.68* 0.63* 0.92   GLUCOSE 93  109* 89 109*   CALCIUM 8.4* 8.5* 7.9* 8.1*   TOTAL PROTEIN  --  6.4  --  6.6   ALBUMIN  --  2.6*  --  3.0*   GLOBULIN  --  3.8  --  3.6     -4/26 chest CT demonstrating a 10 cm cavitary lesion in the left lower lobe with mild mediastinal adenopathy consistent with necrotizing pneumonia    Assessment & Plan   Assessment / Plan     Active Hospital Problems:  Active Hospital Problems    Diagnosis    • **Pneumonia    • Abscess of lower lobe of left lung with pneumonia        Impression:  Left lower lobe necrotizing pneumonia from unspecified organism  Left lower lobe lung abscess from unspecified organism  Question aspiration and airways versus necrotizing pneumonia  Mediastinal adenopathy likely reactive  Leukocytosis  Hypocalcemia  Hypokalemia  Hyponatremia, clinically insignificant  Therapeutic drug monitoring of antibiotics  History substance abuse, heroin, marijuana, methamphetamines on Suboxone  History of tobacco abuse of cigarettes     Plan:  -Remain on room air.  Continue to maintain SpO2 greater than 90%  -Will need bronchoscopy to clear airways and assess for what organism is causing this lung abscess  -Discussed bronchoscopy with bronchoalveolar lavage, bronchial washing, possible transbronchial biopsy, endobronchial brushing, airway inspection.  Risks and benefits of the procedure were discussed in detail.  Alternatives and options were reviewed.  Risks including pneumothorax, pneumonia, bleeding, respiratory depression and that it could be fatal were all reviewed.  Patient understands and is agreeable for the procedure.  -Proceed with bronchoscopy today.  -Fungal studies pending  -Agree with vancomycin.  MRSA PCR positive.  Patient has multiple severe beta-lactam allergy to penicillins and cephalosporins. Treatment in the setting of severe necrotizing lung and lung abscess with anaerobic coverage is Levaquin and Flagyl.  Will need a minimum of 6 weeks of treatment for lung abscess.  Day 3 of  antibiotics  -Repeat noncontrast chest CT in 1 month  -Continue bronchopulmonary bronchodilator protocols.  Encourage I-S and flutter  -Encourage activity as tolerated  -Continue to trend renal panel and electrolytes.  Replace potassium orally  -Continue antipyretics    DVT prophylaxis:  Medical DVT prophylaxis orders are present.    CODE STATUS:   Code Status (Patient has no pulse and is not breathing): CPR (Attempt to Resuscitate)  Medical Interventions (Patient has pulse or is breathing): Full Support      I have reviewed labs, imaging, pertinent clinical data and provider notes.   I have discussed with bedside nurse and primary service.     Electronically signed by Renard Parson MD, 04/29/24, 7:43 AM EDT.

## 2024-04-30 LAB
ANION GAP SERPL CALCULATED.3IONS-SCNC: 13.8 MMOL/L (ref 5–15)
BUN SERPL-MCNC: 8 MG/DL (ref 6–20)
BUN/CREAT SERPL: 11.6 (ref 7–25)
CALCIUM SPEC-SCNC: 8.1 MG/DL (ref 8.6–10.5)
CHLORIDE SERPL-SCNC: 97 MMOL/L (ref 98–107)
CO2 SERPL-SCNC: 22.2 MMOL/L (ref 22–29)
CREAT SERPL-MCNC: 0.69 MG/DL (ref 0.76–1.27)
EGFRCR SERPLBLD CKD-EPI 2021: 117.8 ML/MIN/1.73
GLUCOSE SERPL-MCNC: 123 MG/DL (ref 65–99)
NIGHT BLUE STAIN TISS: NORMAL
POTASSIUM SERPL-SCNC: 3.5 MMOL/L (ref 3.5–5.2)
QT INTERVAL: 320 MS
QTC INTERVAL: 423 MS
SODIUM SERPL-SCNC: 133 MMOL/L (ref 136–145)
VANCOMYCIN TROUGH SERPL-MCNC: 17.59 MCG/ML (ref 5–20)

## 2024-04-30 PROCEDURE — 99233 SBSQ HOSP IP/OBS HIGH 50: CPT | Performed by: INTERNAL MEDICINE

## 2024-04-30 PROCEDURE — 25010000002 METRONIDAZOLE 500 MG/100ML SOLUTION: Performed by: INTERNAL MEDICINE

## 2024-04-30 PROCEDURE — 25810000003 SODIUM CHLORIDE 0.9 % SOLUTION: Performed by: INTERNAL MEDICINE

## 2024-04-30 PROCEDURE — 94664 DEMO&/EVAL PT USE INHALER: CPT

## 2024-04-30 PROCEDURE — 25010000002 ONDANSETRON PER 1 MG: Performed by: INTERNAL MEDICINE

## 2024-04-30 PROCEDURE — 25010000002 LEVOFLOXACIN PER 250 MG: Performed by: INTERNAL MEDICINE

## 2024-04-30 PROCEDURE — 80048 BASIC METABOLIC PNL TOTAL CA: CPT | Performed by: INTERNAL MEDICINE

## 2024-04-30 PROCEDURE — 94799 UNLISTED PULMONARY SVC/PX: CPT

## 2024-04-30 PROCEDURE — 25010000002 VANCOMYCIN 5 G RECONSTITUTED SOLUTION: Performed by: INTERNAL MEDICINE

## 2024-04-30 PROCEDURE — 25010000002 ENOXAPARIN PER 10 MG: Performed by: INTERNAL MEDICINE

## 2024-04-30 PROCEDURE — 80202 ASSAY OF VANCOMYCIN: CPT | Performed by: INTERNAL MEDICINE

## 2024-04-30 RX ADMIN — METRONIDAZOLE 500 MG: 5 INJECTION, SOLUTION INTRAVENOUS at 09:13

## 2024-04-30 RX ADMIN — ACETAMINOPHEN 650 MG: 325 TABLET ORAL at 20:48

## 2024-04-30 RX ADMIN — BENZONATATE 200 MG: 100 CAPSULE ORAL at 08:20

## 2024-04-30 RX ADMIN — IPRATROPIUM BROMIDE AND ALBUTEROL SULFATE 3 ML: .5; 3 SOLUTION RESPIRATORY (INHALATION) at 00:35

## 2024-04-30 RX ADMIN — VANCOMYCIN HYDROCHLORIDE 1250 MG: 5 INJECTION, POWDER, LYOPHILIZED, FOR SOLUTION INTRAVENOUS at 13:18

## 2024-04-30 RX ADMIN — GUAIFENESIN AND DEXTROMETHORPHAN 5 ML: 100; 10 SYRUP ORAL at 04:33

## 2024-04-30 RX ADMIN — GUAIFENESIN AND DEXTROMETHORPHAN 5 ML: 100; 10 SYRUP ORAL at 13:18

## 2024-04-30 RX ADMIN — IPRATROPIUM BROMIDE AND ALBUTEROL SULFATE 3 ML: .5; 3 SOLUTION RESPIRATORY (INHALATION) at 14:39

## 2024-04-30 RX ADMIN — QUETIAPINE FUMARATE 300 MG: 200 TABLET ORAL at 20:49

## 2024-04-30 RX ADMIN — LEVOFLOXACIN 750 MG: 5 INJECTION, SOLUTION INTRAVENOUS at 17:44

## 2024-04-30 RX ADMIN — GUAIFENESIN AND DEXTROMETHORPHAN 5 ML: 100; 10 SYRUP ORAL at 17:50

## 2024-04-30 RX ADMIN — GUAIFENESIN AND DEXTROMETHORPHAN 5 ML: 100; 10 SYRUP ORAL at 22:25

## 2024-04-30 RX ADMIN — IPRATROPIUM BROMIDE AND ALBUTEROL SULFATE 3 ML: .5; 3 SOLUTION RESPIRATORY (INHALATION) at 21:43

## 2024-04-30 RX ADMIN — PREGABALIN 150 MG: 25 CAPSULE ORAL at 08:20

## 2024-04-30 RX ADMIN — IPRATROPIUM BROMIDE AND ALBUTEROL SULFATE 3 ML: .5; 3 SOLUTION RESPIRATORY (INHALATION) at 07:22

## 2024-04-30 RX ADMIN — BENZONATATE 200 MG: 100 CAPSULE ORAL at 20:49

## 2024-04-30 RX ADMIN — VANCOMYCIN HYDROCHLORIDE 1250 MG: 5 INJECTION, POWDER, LYOPHILIZED, FOR SOLUTION INTRAVENOUS at 04:33

## 2024-04-30 RX ADMIN — BUPRENORPHINE AND NALOXONE 1 TABLET: 8; 2 TABLET SUBLINGUAL at 08:20

## 2024-04-30 RX ADMIN — PREGABALIN 150 MG: 25 CAPSULE ORAL at 20:48

## 2024-04-30 RX ADMIN — VANCOMYCIN HYDROCHLORIDE 1250 MG: 5 INJECTION, POWDER, LYOPHILIZED, FOR SOLUTION INTRAVENOUS at 20:50

## 2024-04-30 RX ADMIN — OXCARBAZEPINE 600 MG: 300 TABLET, FILM COATED ORAL at 14:46

## 2024-04-30 RX ADMIN — PREGABALIN 150 MG: 25 CAPSULE ORAL at 14:46

## 2024-04-30 RX ADMIN — OXCARBAZEPINE 600 MG: 300 TABLET, FILM COATED ORAL at 20:49

## 2024-04-30 RX ADMIN — METRONIDAZOLE 500 MG: 5 INJECTION, SOLUTION INTRAVENOUS at 00:52

## 2024-04-30 RX ADMIN — OXCARBAZEPINE 600 MG: 300 TABLET, FILM COATED ORAL at 08:20

## 2024-04-30 RX ADMIN — BENZONATATE 200 MG: 100 CAPSULE ORAL at 14:46

## 2024-04-30 RX ADMIN — METRONIDAZOLE 500 MG: 5 INJECTION, SOLUTION INTRAVENOUS at 17:44

## 2024-04-30 RX ADMIN — FAMOTIDINE 40 MG: 20 TABLET ORAL at 08:20

## 2024-04-30 RX ADMIN — GUAIFENESIN AND DEXTROMETHORPHAN 5 ML: 100; 10 SYRUP ORAL at 00:52

## 2024-04-30 RX ADMIN — ACETAMINOPHEN 650 MG: 325 TABLET ORAL at 08:20

## 2024-04-30 RX ADMIN — ONDANSETRON 4 MG: 2 INJECTION INTRAMUSCULAR; INTRAVENOUS at 13:25

## 2024-04-30 RX ADMIN — ENOXAPARIN SODIUM 30 MG: 100 INJECTION SUBCUTANEOUS at 20:48

## 2024-04-30 RX ADMIN — ONDANSETRON 4 MG: 2 INJECTION INTRAMUSCULAR; INTRAVENOUS at 06:20

## 2024-04-30 RX ADMIN — Medication 10 ML: at 08:19

## 2024-04-30 NOTE — PROGRESS NOTES
Pulmonary / Critical Care Progress Note      Patient Name: Rhett Aguirre  : 1980  MRN: [id number]  Attending:  Yasmine Hdz MD  Date of admission: 2024    Subjective   Subjective   Follow-up for necrotizing pneumonia    Over past 24 hours: Remained on Levaquin, Flagyl, and vancomycin course.  Remains on Suboxone    No acute events overnight    This morning,  Currently on room air  Resting in bed  Feeling better than yesterday  Tmax 101  Denies any chest pain or chest tightness  Denies nausea or vomiting  Denies cough or hemoptysis  Diuresing well  -2 L urine  Asking when he would be able to leave    Objective   Objective     Vitals:   Temp:  [97.1 °F (36.2 °C)-101 °F (38.3 °C)] 100 °F (37.8 °C)  Heart Rate:  [] 110  Resp:  [16-22] 18  BP: ()/(56-95) 108/68  Flow (L/min):  [3] 3    Physical Exam   Vital Signs Reviewed   General:  WDWN, Alert, in no acute distress.  Pleasant male, sitting up in bed  HEENT:  PERRL, EOMI.  OP, nares clear  Chest:  g good aeration with scattered rhonchi, remains on room air  CV: RRR, no MGR, pulses 2+, equal.  Abd:  Soft, NT, ND, + BS, no HSM  EXT:  no clubbing, no cyanosis, no edema  Neuro:  A&Ox3, CN grossly intact, no focal deficits.  Skin: No rashes or lesions noted      Result Review    Result Review:  I have personally reviewed the results from the time of this admission to 2024 07:44 EDT and agree with these findings:  [x]  Laboratory  [x]  Microbiology  [x]  Radiology  []  EKG/Telemetry   []  Cardiology/Vascular   []  Pathology  []  Old records  []  Other:  Most notable findings include:         Lab 24  0534 24  0600 24  0453 24  1701   WBC 17.35* 18.39*  --  22.21*   HEMOGLOBIN 9.9* 10.0*  --  10.0*   HEMATOCRIT 30.0* 29.3*  --  29.3*   PLATELETS 656* 621*  --  598*   SODIUM 136 134* 140 135*   POTASSIUM 4.1 3.7 3.4* 3.5   CHLORIDE 100 99 104 96*   CO2 23.4 24.0 25.0 27.6   BUN 6 4* 9 15   CREATININE 0.72*  0.68* 0.63* 0.92   GLUCOSE 93 109* 89 109*   CALCIUM 8.4* 8.5* 7.9* 8.1*   TOTAL PROTEIN  --  6.4  --  6.6   ALBUMIN  --  2.6*  --  3.0*   GLOBULIN  --  3.8  --  3.6     -4/26 chest CT demonstrating a 10 cm cavitary lesion in the left lower lobe with mild mediastinal adenopathy consistent with necrotizing pneumonia    Assessment & Plan   Assessment / Plan     Active Hospital Problems:  Active Hospital Problems    Diagnosis    • **Necrotizing pneumonia        Impression:  Left lower lobe necrotizing pneumonia from unspecified organism  Left lower lobe lung abscess from unspecified organism  Question aspiration and airways versus necrotizing pneumonia  Mediastinal adenopathy likely reactive  Leukocytosis  Hypocalcemia  Hypokalemia  Hyponatremia, clinically insignificant  Therapeutic drug monitoring of antibiotics  History substance abuse, heroin, marijuana, methamphetamines on Suboxone  History of tobacco abuse of cigarettes     Plan:  -Remain on room air.  Continue to maintain SpO2 greater than 90%   -Patient s/p bronchoscopy.  BAL negative.  Fungal studies and cultures pending   -Agree with vancomycin.  MRSA PCR positive.  Patient has multiple severe beta-lactam allergy to penicillins and cephalosporins. Treatment in the setting of severe necrotizing lung and lung abscess with anaerobic coverage is Levaquin and Flagyl.  Will need a minimum of 6 weeks of treatment for lung abscess.  Day 3 of antibiotics   -Repeat noncontrast chest CT in 1 month   -Continue bronchopulmonary bronchodilator protocols.  Encourage I-S and flutter   -Encourage activity as tolerated   -Continue to trend renal panel and electrolytes.  Replace potassium orally   -Continue antipyretics          DVT prophylaxis:  Medical DVT prophylaxis orders are present.    CODE STATUS:   Code Status (Patient has no pulse and is not breathing): CPR (Attempt to Resuscitate)  Medical Interventions (Patient has pulse or is breathing): Full Support    I have  reviewed labs, imaging, pertinent clinical data and provider notes.   I have discussed with bedside nurse and primary service.     Electronically signed by TERRI Roger, 04/30/24, 10:31 AM EDT.  Electronically signed by Renard Parson MD, 04/30/24, 7:44 AM EDT.    This visit was performed by BOTH a physician and an APC. I personally evaluated and examined the patient. I performed all aspects of MDM as documented. , I have reviewed and confirmed the accuracy of the patient's history as documented in this note., and I have reexamined the patient and the results are consistent with the previously documented exam. I have updated the documentation as necessary.     Electronically signed by Renard Parson MD, 04/30/24, 3:03 PM EDT.

## 2024-04-30 NOTE — PLAN OF CARE
Goal Outcome Evaluation:        Problem: Adjustment to Illness (Sepsis/Septic Shock)  Goal: Optimal Coping  Outcome: Ongoing, Progressing     Problem: Nutrition Impaired (Sepsis/Septic Shock)  Goal: Optimal Nutrition Intake  Outcome: Ongoing, Progressing     Problem: Skin Injury Risk Increased  Goal: Skin Health and Integrity  Outcome: Ongoing, Progressing  Intervention: Optimize Skin Protection  Recent Flowsheet Documentation  Taken 4/30/2024 0000 by Catarina Noguera, RN  Head of Bed (HOB) Positioning: HOB elevated  Taken 4/29/2024 2200 by Catarina Noguera, RN  Head of Bed (HOB) Positioning: HOB elevated

## 2024-04-30 NOTE — PROGRESS NOTES
[facility]     Progress Note    Patient Name: Rhett Aguirre  : 1980  MRN: [id number]  Primary Care Physician:  Chris Potts MD  Date of admission: 2024    Subjective patient is feeling well  Coughing it better  Patient had bronchoscopy yesterday    Review of Systems  All review of systems are negative except as mentioned in subjective complaints.    Objective   Objective     Vitals:   Temp:  [97.1 °F (36.2 °C)-101 °F (38.3 °C)] 100 °F (37.8 °C)  Heart Rate:  [] 110  Resp:  [16-22] 18  BP: ()/(54-95) 122/54  Flow (L/min):  [3] 3  Physical Exam    Constitutional: Awake, alert responsive, conversant, no obvious distress              Psychiatric:  Appropriate affect, cooperative   Neurologic:  Awake alert ,oriented x 3, strength symmetric in all extremities, Cranial Nerves grossly intact to confrontation, speech clear   Eyes:   PERRLA, sclerae anicteric, no conjunctival injection   HEENT:  Moist mucous membranes, no nasal or eye discharge, no throat congestion   Neck:   Supple, no thyromegaly, no lymphadenopathy, trachea midline, no elevated JVD   Respiratory:  Clear to auscultation bilaterally, nonlabored respirations    Cardiovascular: RRR, no murmurs, rubs, or gallops, palpable pedal pulses bilaterally, No bilateral ankle edema   Gastrointestinal: Positive bowel sounds, soft, nontender, nondistended, no organomegaly   Musculoskeletal:  No clubbing or cyanosis to extremities,muscle wasting, joint swelling, muscle weakness             Skin:                      No rashes, bruising, skin ulcers, petechiae or ecchymosis    Result Review    Result Review:  I have personally reviewed the results from the time of this admission to 2024 09:55 EDT and agree with these findings:  []  Laboratory  []  Microbiology  []  Radiology  []  EKG/Telemetry   []  Cardiology/Vascular   []  Pathology  []  Old records  []  Other:    Results from last 7 days   Lab Units 24  3961  04/28/24  0600 04/26/24  1701   WBC 10*3/mm3 17.35* 18.39* 22.21*   HEMOGLOBIN g/dL 9.9* 10.0* 10.0*   PLATELETS 10*3/mm3 656* 621* 598*     Results from last 7 days   Lab Units 04/30/24  0821 04/29/24  0534 04/28/24  0600 04/27/24  0453 04/26/24  1701   SODIUM mmol/L 133* 136 134* 140 135*   POTASSIUM mmol/L 3.5 4.1 3.7 3.4* 3.5   CHLORIDE mmol/L 97* 100 99 104 96*   CO2 mmol/L 22.2 23.4 24.0 25.0 27.6   ANION GAP mmol/L 13.8 12.6 11.0 11.0 11.4   BUN mg/dL 8 6 4* 9 15   CREATININE mg/dL 0.69* 0.72* 0.68* 0.63* 0.92   GLUCOSE mg/dL 123* 93 109* 89 109*       Assessment & Plan   Assessment / Plan       Active Hospital Problems:    Active Hospital Problems    Diagnosis  POA   • **Necrotizing pneumonia [J85.0]  Yes   Status post bronchoscopy    Plan:   Continue IV antibiotics  Ambulate patient    Electronically signed by Junaid Wu MD, 04/30/24, 9:55 AM EDT.

## 2024-04-30 NOTE — SIGNIFICANT NOTE
04/30/24 1115   Coping/Psychosocial   Observed Emotional State calm;cooperative   Verbalized Emotional State relief;hopefulness   Trust Relationship/Rapport empathic listening provided   Involvement in Care interacting with patient   Additional Documentation Spiritual Care (Group)   Spiritual Care   Use of Spiritual Resources non-Mormonism use of spiritual care   Spiritual Care Source  initiative   Spiritual Care Follow-Up follow-up, none required as presently assessed   Response to Spiritual Care receptive of support;engaged in conversation   Spiritual Care Interventions supportive conversation provided   Spiritual Care Visit Type initial   Receptivity to Spiritual Care visit welcomed

## 2024-04-30 NOTE — PROGRESS NOTES
"[facility] Clinical Pharmacy Services: Vancomycin Monitoring Note    Rhett Aguirre is a 43 y.o. male who is on day  of pharmacy to dose vancomycin for  necrotizing pneumonia .    Previous Vancomycin Dose:   1250 mg IV every  8  hours  Imaging Reviewed?: Yes    Updated Cultures and Sensitivities:    resp cx- wash ,in process   gram stain ,brush LLL no organisms   BAL lavage ,LLL in process   PNA panel :negative   MRSA PCR: positive   S. Pneumo/legionella: negative   Resp cx: rare normal respiratory kd    Blood cx : NGTD       Vitals/Labs  Ht: 180.3 cm (70.98\"); Wt: 91.4 kg (201 lb 8 oz)   Temp (24hrs), Av.5 °F (37.5 °C), Min:97.1 °F (36.2 °C), Max:101 °F (38.3 °C)   Estimated Creatinine Clearance: 159.5 mL/min (A) (by C-G formula based on SCr of 0.69 mg/dL (L)).     Results from last 7 days   Lab Units 24  1117 24  0821 24  0534 24  0600 24  0453 24  1701   VANCOMYCIN RM mcg/mL  --   --   --  13.16  --   --    VANCOMYCIN TR mcg/mL 17.59  --   --   --   --   --    CREATININE mg/dL  --  0.69* 0.72* 0.68*   < > 0.92   WBC 10*3/mm3  --   --  17.35* 18.39*  --  22.21*    < > = values in this interval not displayed.     Assessment/Plan    Current Vancomycin Dose:  1250 mg IV every 8 hours; which provides the following predicted parameters:    AUC24,ss: 519 mg/L.hr  PAUC*: 98 %  Ctrough,ss: 15.2 mg/L  Pconc*: 7 %  Tox.: 10 %  Continue with current regimen.   We will continue to monitor patient changes and renal function     Thank you for involving pharmacy in this patient's care. Please contact pharmacy with any questions or concerns.    Jenny Sweet  Clinical Pharmacist      "

## 2024-04-30 NOTE — PLAN OF CARE
Goal Outcome Evaluation:  Plan of Care Reviewed With: patient           Patient Aox4, less coughing today. Complaints of nausea through the night and this afternoon, medicated per MAR with relief. Ambulated in room without assistance. Continuing plan of care.

## 2024-05-01 LAB
BACTERIA SPEC AEROBE CULT: NO GROWTH
BACTERIA SPEC AEROBE CULT: NORMAL
BACTERIA SPEC AEROBE CULT: NORMAL
BACTERIA SPEC RESP CULT: NO GROWTH
CYTO UR: NORMAL
GAMMA INTERFERON BACKGROUND BLD IA-ACNC: 0.02 IU/ML
GRAM STN SPEC: NORMAL
LAB AP CASE REPORT: NORMAL
LAB AP CLINICAL INFORMATION: NORMAL
LAB AP DIAGNOSIS COMMENT: NORMAL
M TB IFN-G BLD-IMP: ABNORMAL
M TB IFN-G CD4+ BCKGRND COR BLD-ACNC: 0.01 IU/ML
M TB IFN-G CD4+CD8+ BCKGRND COR BLD-ACNC: 0.02 IU/ML
MITOGEN IGNF BCKGRD COR BLD-ACNC: 0.04 IU/ML
PATH REPORT.FINAL DX SPEC: NORMAL
PATH REPORT.GROSS SPEC: NORMAL
SERVICE CMNT-IMP: ABNORMAL

## 2024-05-01 PROCEDURE — 99232 SBSQ HOSP IP/OBS MODERATE 35: CPT | Performed by: INTERNAL MEDICINE

## 2024-05-01 PROCEDURE — 94799 UNLISTED PULMONARY SVC/PX: CPT

## 2024-05-01 PROCEDURE — 25810000003 SODIUM CHLORIDE 0.9 % SOLUTION: Performed by: INTERNAL MEDICINE

## 2024-05-01 PROCEDURE — 25010000002 ENOXAPARIN PER 10 MG: Performed by: INTERNAL MEDICINE

## 2024-05-01 PROCEDURE — 94664 DEMO&/EVAL PT USE INHALER: CPT

## 2024-05-01 PROCEDURE — 25010000002 ONDANSETRON PER 1 MG: Performed by: INTERNAL MEDICINE

## 2024-05-01 PROCEDURE — 25010000002 VANCOMYCIN 5 G RECONSTITUTED SOLUTION: Performed by: INTERNAL MEDICINE

## 2024-05-01 PROCEDURE — 25010000002 METRONIDAZOLE 500 MG/100ML SOLUTION: Performed by: INTERNAL MEDICINE

## 2024-05-01 PROCEDURE — 25010000002 LEVOFLOXACIN PER 250 MG: Performed by: INTERNAL MEDICINE

## 2024-05-01 RX ORDER — IPRATROPIUM BROMIDE AND ALBUTEROL SULFATE 2.5; .5 MG/3ML; MG/3ML
3 SOLUTION RESPIRATORY (INHALATION) EVERY 6 HOURS PRN
Status: DISCONTINUED | OUTPATIENT
Start: 2024-05-01 | End: 2024-05-03 | Stop reason: HOSPADM

## 2024-05-01 RX ADMIN — PREGABALIN 150 MG: 25 CAPSULE ORAL at 15:18

## 2024-05-01 RX ADMIN — ONDANSETRON 4 MG: 2 INJECTION INTRAMUSCULAR; INTRAVENOUS at 16:34

## 2024-05-01 RX ADMIN — METRONIDAZOLE 500 MG: 5 INJECTION, SOLUTION INTRAVENOUS at 02:44

## 2024-05-01 RX ADMIN — ACETAMINOPHEN 650 MG: 325 TABLET ORAL at 11:21

## 2024-05-01 RX ADMIN — GUAIFENESIN AND DEXTROMETHORPHAN 5 ML: 100; 10 SYRUP ORAL at 08:45

## 2024-05-01 RX ADMIN — VANCOMYCIN HYDROCHLORIDE 1250 MG: 5 INJECTION, POWDER, LYOPHILIZED, FOR SOLUTION INTRAVENOUS at 20:30

## 2024-05-01 RX ADMIN — BUPRENORPHINE AND NALOXONE 1 TABLET: 8; 2 TABLET SUBLINGUAL at 08:44

## 2024-05-01 RX ADMIN — OXCARBAZEPINE 600 MG: 300 TABLET, FILM COATED ORAL at 20:18

## 2024-05-01 RX ADMIN — ALPRAZOLAM 0.5 MG: 0.25 TABLET ORAL at 15:55

## 2024-05-01 RX ADMIN — VANCOMYCIN HYDROCHLORIDE 1250 MG: 5 INJECTION, POWDER, LYOPHILIZED, FOR SOLUTION INTRAVENOUS at 05:23

## 2024-05-01 RX ADMIN — BENZONATATE 200 MG: 100 CAPSULE ORAL at 15:18

## 2024-05-01 RX ADMIN — OXCARBAZEPINE 600 MG: 300 TABLET, FILM COATED ORAL at 08:45

## 2024-05-01 RX ADMIN — PREGABALIN 150 MG: 25 CAPSULE ORAL at 08:44

## 2024-05-01 RX ADMIN — VANCOMYCIN HYDROCHLORIDE 1250 MG: 5 INJECTION, POWDER, LYOPHILIZED, FOR SOLUTION INTRAVENOUS at 13:30

## 2024-05-01 RX ADMIN — OXCARBAZEPINE 600 MG: 300 TABLET, FILM COATED ORAL at 15:17

## 2024-05-01 RX ADMIN — METRONIDAZOLE 500 MG: 5 INJECTION, SOLUTION INTRAVENOUS at 16:34

## 2024-05-01 RX ADMIN — LEVOFLOXACIN 750 MG: 5 INJECTION, SOLUTION INTRAVENOUS at 18:17

## 2024-05-01 RX ADMIN — ACETAMINOPHEN 650 MG: 325 TABLET ORAL at 20:19

## 2024-05-01 RX ADMIN — ENOXAPARIN SODIUM 30 MG: 100 INJECTION SUBCUTANEOUS at 20:19

## 2024-05-01 RX ADMIN — GUAIFENESIN AND DEXTROMETHORPHAN 5 ML: 100; 10 SYRUP ORAL at 20:18

## 2024-05-01 RX ADMIN — IPRATROPIUM BROMIDE AND ALBUTEROL SULFATE 3 ML: .5; 3 SOLUTION RESPIRATORY (INHALATION) at 08:03

## 2024-05-01 RX ADMIN — BENZONATATE 200 MG: 100 CAPSULE ORAL at 08:44

## 2024-05-01 RX ADMIN — GUAIFENESIN AND DEXTROMETHORPHAN 5 ML: 100; 10 SYRUP ORAL at 14:03

## 2024-05-01 RX ADMIN — FAMOTIDINE 40 MG: 20 TABLET ORAL at 08:45

## 2024-05-01 RX ADMIN — QUETIAPINE FUMARATE 300 MG: 200 TABLET ORAL at 20:18

## 2024-05-01 RX ADMIN — PREGABALIN 150 MG: 25 CAPSULE ORAL at 20:18

## 2024-05-01 RX ADMIN — METRONIDAZOLE 500 MG: 5 INJECTION, SOLUTION INTRAVENOUS at 08:45

## 2024-05-01 RX ADMIN — IPRATROPIUM BROMIDE AND ALBUTEROL SULFATE 3 ML: .5; 3 SOLUTION RESPIRATORY (INHALATION) at 01:31

## 2024-05-01 RX ADMIN — BENZONATATE 200 MG: 100 CAPSULE ORAL at 20:19

## 2024-05-01 NOTE — PROGRESS NOTES
[facility]     Progress Note    Patient Name: Rhett Aguirre  : 1980  MRN: [id number]  Primary Care Physician:  Chris Potts MD  Date of admission: 2024    Subjective   No major acute events overnight  Patient feels better overall but sligtly tired today    Scheduled Meds:benzonatate, 200 mg, Oral, TID  buprenorphine-naloxone, 1 tablet, Sublingual, Daily  enoxaparin, 30 mg, Subcutaneous, Q24H  famotidine, 40 mg, Oral, Daily  levoFLOXacin, 750 mg, Intravenous, Q24H  metroNIDAZOLE, 500 mg, Intravenous, Q8H  OXcarbazepine, 600 mg, Oral, TID  pregabalin, 150 mg, Oral, TID  QUEtiapine, 300 mg, Oral, Nightly  vancomycin, 1,250 mg, Intravenous, Q8H      Continuous Infusions:lactated ringers, 30 mL/hr, Last Rate: 30 mL/hr (24 1745)  Pharmacy to Dose LevoFLOXacin (LEVAQUIN),   Pharmacy to dose vancomycin,       PRN Meds:.•  acetaminophen **OR** acetaminophen **OR** acetaminophen  •  ALPRAZolam  •  aluminum-magnesium hydroxide-simethicone  •  senna-docusate sodium **AND** polyethylene glycol **AND** bisacodyl **AND** bisacodyl  •  guaiFENesin-dextromethorphan  •  ipratropium-albuterol  •  melatonin  •  nitroglycerin  •  ondansetron  •  Pharmacy to Dose LevoFLOXacin (LEVAQUIN)  •  Pharmacy to dose vancomycin  •  sodium chloride  •  [COMPLETED] Insert Peripheral IV **AND** sodium chloride       Review of Systems  Constitutional:        Weakness tiredness fatigue  Eyes:                       No blurry vision, eye discharge, eye irritation, eye pain  HEENT:                   No acute hair loss, earache and discharge, nasal congestion or discharge, sore throat, postnasal drip  Respiratory:           No shortness of breath coughing sputum production wheezing hemoptysis pleuritic chest pain  Cardiovascular:     No chest pain, orthopnea, PND, dizziness, palpitation, lower extremity edema  Gastrointestinal:   No nausea vomiting diarrhea abdominal pain constipation  Genitourinary:       No  urinary incontinence, hesitancy, frequency, urgency, dysuria  Hematologic:         No bruising, bleeding, pallor, lymphadenopathy  Endocrine:            No coldness, hot flashes, polyuria, abnormal hair growth  Musculoskeletal:    No body pains, aches, arthritic pains, muscle pain ,muscle wasting  Psychiatric:          No low or high mood, anxiety, hallucinations, delusions  Skin.                      No rash, ulcers, bruising, itching  Neurological:        No confusion, headache, focal weakness, numbness, dysphasia    Objective   Objective     Vitals:   Temp:  [97.3 °F (36.3 °C)-101.6 °F (38.7 °C)] 97.3 °F (36.3 °C)  Heart Rate:  [] 94  Resp:  [18] 18  BP: (118-142)/(56-72) 124/58  Physical Exam    Constitutional: Awake, alert responsive, conversant, no obvious distress              Psychiatric:  Appropriate affect, cooperative   Neurologic:  Awake alert ,oriented x 3, strength symmetric in all extremities, Cranial Nerves grossly intact to confrontation, speech clear   Eyes:   PERRLA, sclerae anicteric, no conjunctival injection   HEENT:  Moist mucous membranes, no nasal or eye discharge, no throat congestion   Neck:   Supple, no thyromegaly, no lymphadenopathy, trachea midline, no elevated JVD   Respiratory:  Clear to auscultation bilaterally, nonlabored respirations    Cardiovascular: RRR, no murmurs, rubs, or gallops, palpable pedal pulses bilaterally, No bilateral ankle edema   Gastrointestinal: Positive bowel sounds, soft, nontender, nondistended, no organomegaly   Musculoskeletal:  No clubbing or cyanosis to extremities,muscle wasting, joint swelling, muscle weakness             Skin:                      No rashes, bruising, skin ulcers, petechiae or ecchymosis    Result Review    Result Review:  I have personally reviewed the results from the time of this admission to 5/1/2024 10:49 EDT and agree with these findings:  []  Laboratory  []  Microbiology  []  Radiology  []  EKG/Telemetry   []   Cardiology/Vascular   []  Pathology  []  Old records  []  Other:    Assessment & Plan   Assessment / Plan       Active Hospital Problems:  Active Hospital Problems    Diagnosis    • **Necrotizing pneumonia      43-year-old male chronically on Suboxone, bipolar disorder came in due to cough sore throat nausea and vomiting associated with fever in background of being treated with Z-Richie noted to have high-grade fever upon admission and x-ray showing left lower lobe consolidation and on CT scan showing dense consolidation with necrosis with mild pleural effusion. Patient status bronch with negative prlim results. Other pathogens testing negative     Plan:   Continue levequin and flagyl. Will DC Vanc on discharge  Will continue for 6 weeks  DuoNebs scheduled  Continue Suboxone at home dose  Continue Triptil, Lyrica and Seroquel at home doses  Will follow with pulm on discharge    Electronically signed by Yasmine Hdz MD, 05/01/24, 10:49 AM EDT.

## 2024-05-01 NOTE — PLAN OF CARE
Goal Outcome Evaluation:               Patient alert and oriented x4 and can make needs known, patient cough controlled throughout the night, patient vital signs stable, pain controlled throughout the night. Urine out put and measured.

## 2024-05-01 NOTE — PROGRESS NOTES
Pulmonary / Critical Care Progress Note      Patient Name: Rhett Aguirre  : 1980  MRN: [id number]  Attending:  Yasmine Hdz MD  Date of admission: 2024    Subjective   Subjective   Follow-up for necrotizing pneumonia    Over past 24 hours: Remained on Levaquin, Flagyl, and vancomycin.  Remains on Suboxone    No acute events overnight    This morning,  Remains on room air  Lying in bed  Overall feeling better  Tmax 101.6 in last 24 hours  Denies chest pain or chest tightness  Denies cough or hemoptysis  Diuresing well  1750 mL UOP/24 hours  Hoping to discharge home tomorrow    Objective   Objective     Vitals:   Temp:  [97.8 °F (36.6 °C)-101.6 °F (38.7 °C)] 98.2 °F (36.8 °C)  Heart Rate:  [102-113] 102  Resp:  [18] 18  BP: (118-142)/(56-72) 128/72    Physical Exam   Vital Signs Reviewed   General:  WDWN, Alert, in no acute distress.  Pleasant male, lying in bed  HEENT:  PERRL, EOMI.  OP, nares clear  Chest:  good aeration with improved rhonchi, no work of breathing noted on room air  CV: RRR, no MGR, pulses 2+, equal.  Abd:  Soft, NT, ND, + BS, no HSM  EXT:  no clubbing, no cyanosis, no edema  Neuro:  A&Ox3, CN grossly intact, no focal deficits.  Skin: No rashes or lesions noted      Result Review    Result Review:  I have personally reviewed the results from the time of this admission to 2024 07:32 EDT and agree with these findings:  [x]  Laboratory  [x]  Microbiology  [x]  Radiology  []  EKG/Telemetry   []  Cardiology/Vascular   []  Pathology  []  Old records  []  Other:  Most notable findings include:         Lab 24  0821 24  0534 24  0600 24  0453 24  1701   WBC  --  17.35* 18.39*  --  22.21*   HEMOGLOBIN  --  9.9* 10.0*  --  10.0*   HEMATOCRIT  --  30.0* 29.3*  --  29.3*   PLATELETS  --  656* 621*  --  598*   SODIUM 133* 136 134* 140 135*   POTASSIUM 3.5 4.1 3.7 3.4* 3.5   CHLORIDE 97* 100 99 104 96*   CO2 22.2 23.4 24.0 25.0 27.6   BUN 8 6 4* 9 15    CREATININE 0.69* 0.72* 0.68* 0.63* 0.92   GLUCOSE 123* 93 109* 89 109*   CALCIUM 8.1* 8.4* 8.5* 7.9* 8.1*   TOTAL PROTEIN  --   --  6.4  --  6.6   ALBUMIN  --   --  2.6*  --  3.0*   GLOBULIN  --   --  3.8  --  3.6     -4/26 chest CT demonstrating a 10 cm cavitary lesion in the left lower lobe with mild mediastinal adenopathy consistent with necrotizing pneumonia    Assessment & Plan   Assessment / Plan     Active Hospital Problems:  Active Hospital Problems    Diagnosis     **Necrotizing pneumonia        Impression:  Left lower lobe necrotizing pneumonia from unspecified organism  Left lower lobe lung abscess from unspecified organism  Question aspiration and airways versus necrotizing pneumonia  Mediastinal adenopathy likely reactive  Leukocytosis  Hypocalcemia  Hypokalemia  Hyponatremia, clinically insignificant  Therapeutic drug monitoring of antibiotics  History substance abuse, heroin, marijuana, methamphetamines on Suboxone  History of tobacco abuse of cigarettes     Plan:  -Remain on room air.  Continue to maintain SpO2 greater than 90%   -Patient s/p bronchoscopy.  BAL negative.  Fungal studies and cultures pending   -Agree with vancomycin.  MRSA PCR positive.  Patient has multiple severe beta-lactam allergy to penicillins and cephalosporins. Treatment in the setting of severe necrotizing lung and lung abscess with anaerobic coverage is Levaquin and Flagyl.  Will need a minimum of 6 weeks of treatment for lung abscess.  Day 5 of antibiotics   -Repeat noncontrast chest CT in 1 month   -Continue bronchopulmonary bronchodilator protocols.  Encourage I-S and flutter   -Encourage activity as tolerated   -Continue to trend renal panel and electrolytes.  Replace potassium orally   -Continue antipyretics   -Okay to discharge home from pulmonary standpoint  -Follow-up with pulmonology in 1 to 2 weeks after discharge         DVT prophylaxis:  Medical DVT prophylaxis orders are present.    CODE STATUS:   Code Status  (Patient has no pulse and is not breathing): CPR (Attempt to Resuscitate)  Medical Interventions (Patient has pulse or is breathing): Full Support    I have reviewed labs, imaging, pertinent clinical data and provider notes.   I have discussed with bedside nurse and primary service.     Electronically signed by Renard Parson MD, 05/01/24, 7:32 AM EDT.    Electronically signed by TERRI Dozier, 05/01/24, 2:43 PM EDT.    This visit was performed by BOTH a physician and an APC. I personally evaluated and examined the patient. I performed all aspects of MDM as documented. , I have reviewed and confirmed the accuracy of the patient's history as documented in this note., and I have reexamined the patient and the results are consistent with the previously documented exam. I have updated the documentation as necessary.     Electronically signed by Renard Parson MD, 05/01/24, 5:52 PM EDT.

## 2024-05-02 LAB
ANION GAP SERPL CALCULATED.3IONS-SCNC: 9.4 MMOL/L (ref 5–15)
BUN SERPL-MCNC: 9 MG/DL (ref 6–20)
BUN/CREAT SERPL: 14.5 (ref 7–25)
CALCIUM SPEC-SCNC: 8.4 MG/DL (ref 8.6–10.5)
CHLORIDE SERPL-SCNC: 102 MMOL/L (ref 98–107)
CO2 SERPL-SCNC: 25.6 MMOL/L (ref 22–29)
CREAT SERPL-MCNC: 0.62 MG/DL (ref 0.76–1.27)
CYTO UR: NORMAL
DPYD GENE MUT ANL BLD/T: NEGATIVE
EGFRCR SERPLBLD CKD-EPI 2021: 121.6 ML/MIN/1.73
FUNGITELL VALUE: <31.25 PG/ML
GLUCOSE SERPL-MCNC: 90 MG/DL (ref 65–99)
IMP & REVIEW OF LAB RESULTS: NORMAL
LAB AP CASE REPORT: NORMAL
LAB AP CLINICAL INFORMATION: NORMAL
LAB AP DIAGNOSIS COMMENT: NORMAL
Lab: NORMAL
Lab: NORMAL
PATH REPORT.FINAL DX SPEC: NORMAL
PATH REPORT.GROSS SPEC: NORMAL
PATHOLOGIST NAME: NORMAL
POTASSIUM SERPL-SCNC: 3.9 MMOL/L (ref 3.5–5.2)
PROCALCITONIN SERPL-MCNC: 0.69 NG/ML (ref 0–0.25)
REFERENCE VALUE: NORMAL
SODIUM SERPL-SCNC: 137 MMOL/L (ref 136–145)

## 2024-05-02 PROCEDURE — 25810000003 SODIUM CHLORIDE 0.9 % SOLUTION: Performed by: INTERNAL MEDICINE

## 2024-05-02 PROCEDURE — 87040 BLOOD CULTURE FOR BACTERIA: CPT | Performed by: STUDENT IN AN ORGANIZED HEALTH CARE EDUCATION/TRAINING PROGRAM

## 2024-05-02 PROCEDURE — 84145 PROCALCITONIN (PCT): CPT | Performed by: INTERNAL MEDICINE

## 2024-05-02 PROCEDURE — 99233 SBSQ HOSP IP/OBS HIGH 50: CPT | Performed by: INTERNAL MEDICINE

## 2024-05-02 PROCEDURE — 63710000001 PREDNISONE PER 1 MG: Performed by: STUDENT IN AN ORGANIZED HEALTH CARE EDUCATION/TRAINING PROGRAM

## 2024-05-02 PROCEDURE — 25010000002 METRONIDAZOLE 500 MG/100ML SOLUTION: Performed by: INTERNAL MEDICINE

## 2024-05-02 PROCEDURE — 25010000002 ENOXAPARIN PER 10 MG: Performed by: INTERNAL MEDICINE

## 2024-05-02 PROCEDURE — 80048 BASIC METABOLIC PNL TOTAL CA: CPT | Performed by: STUDENT IN AN ORGANIZED HEALTH CARE EDUCATION/TRAINING PROGRAM

## 2024-05-02 PROCEDURE — 25010000002 LEVOFLOXACIN PER 250 MG: Performed by: INTERNAL MEDICINE

## 2024-05-02 PROCEDURE — 25010000002 VANCOMYCIN 5 G RECONSTITUTED SOLUTION: Performed by: INTERNAL MEDICINE

## 2024-05-02 RX ORDER — PREDNISONE 20 MG/1
40 TABLET ORAL
Status: DISCONTINUED | OUTPATIENT
Start: 2024-05-02 | End: 2024-05-03 | Stop reason: HOSPADM

## 2024-05-02 RX ADMIN — ENOXAPARIN SODIUM 30 MG: 100 INJECTION SUBCUTANEOUS at 21:28

## 2024-05-02 RX ADMIN — PREDNISONE 40 MG: 20 TABLET ORAL at 08:20

## 2024-05-02 RX ADMIN — Medication 10 ML: at 21:30

## 2024-05-02 RX ADMIN — VANCOMYCIN HYDROCHLORIDE 1250 MG: 5 INJECTION, POWDER, LYOPHILIZED, FOR SOLUTION INTRAVENOUS at 05:20

## 2024-05-02 RX ADMIN — ACETAMINOPHEN 650 MG: 325 TABLET ORAL at 08:20

## 2024-05-02 RX ADMIN — GUAIFENESIN AND DEXTROMETHORPHAN 5 ML: 100; 10 SYRUP ORAL at 13:43

## 2024-05-02 RX ADMIN — ALPRAZOLAM 0.5 MG: 0.25 TABLET ORAL at 21:28

## 2024-05-02 RX ADMIN — LEVOFLOXACIN 750 MG: 5 INJECTION, SOLUTION INTRAVENOUS at 20:07

## 2024-05-02 RX ADMIN — ALPRAZOLAM 0.5 MG: 0.25 TABLET ORAL at 13:14

## 2024-05-02 RX ADMIN — VANCOMYCIN HYDROCHLORIDE 1250 MG: 5 INJECTION, POWDER, LYOPHILIZED, FOR SOLUTION INTRAVENOUS at 21:29

## 2024-05-02 RX ADMIN — VANCOMYCIN HYDROCHLORIDE 1250 MG: 5 INJECTION, POWDER, LYOPHILIZED, FOR SOLUTION INTRAVENOUS at 13:44

## 2024-05-02 RX ADMIN — GUAIFENESIN AND DEXTROMETHORPHAN 5 ML: 100; 10 SYRUP ORAL at 05:20

## 2024-05-02 RX ADMIN — OXCARBAZEPINE 600 MG: 300 TABLET, FILM COATED ORAL at 21:28

## 2024-05-02 RX ADMIN — BENZONATATE 200 MG: 100 CAPSULE ORAL at 08:20

## 2024-05-02 RX ADMIN — PREGABALIN 150 MG: 25 CAPSULE ORAL at 21:28

## 2024-05-02 RX ADMIN — QUETIAPINE FUMARATE 300 MG: 200 TABLET ORAL at 21:28

## 2024-05-02 RX ADMIN — GUAIFENESIN AND DEXTROMETHORPHAN 5 ML: 100; 10 SYRUP ORAL at 18:47

## 2024-05-02 RX ADMIN — METRONIDAZOLE 500 MG: 5 INJECTION, SOLUTION INTRAVENOUS at 01:25

## 2024-05-02 RX ADMIN — METRONIDAZOLE 500 MG: 5 INJECTION, SOLUTION INTRAVENOUS at 09:56

## 2024-05-02 RX ADMIN — BENZONATATE 200 MG: 100 CAPSULE ORAL at 21:28

## 2024-05-02 RX ADMIN — GUAIFENESIN AND DEXTROMETHORPHAN 5 ML: 100; 10 SYRUP ORAL at 09:56

## 2024-05-02 RX ADMIN — METRONIDAZOLE 500 MG: 5 INJECTION, SOLUTION INTRAVENOUS at 18:55

## 2024-05-02 RX ADMIN — GUAIFENESIN AND DEXTROMETHORPHAN 5 ML: 100; 10 SYRUP ORAL at 01:25

## 2024-05-02 RX ADMIN — PREGABALIN 150 MG: 25 CAPSULE ORAL at 15:13

## 2024-05-02 RX ADMIN — PREGABALIN 150 MG: 25 CAPSULE ORAL at 08:20

## 2024-05-02 RX ADMIN — BUPRENORPHINE AND NALOXONE 1 TABLET: 8; 2 TABLET SUBLINGUAL at 08:20

## 2024-05-02 RX ADMIN — ALPRAZOLAM 0.5 MG: 0.25 TABLET ORAL at 01:35

## 2024-05-02 RX ADMIN — FAMOTIDINE 40 MG: 20 TABLET ORAL at 08:20

## 2024-05-02 RX ADMIN — OXCARBAZEPINE 600 MG: 300 TABLET, FILM COATED ORAL at 15:13

## 2024-05-02 RX ADMIN — BENZONATATE 200 MG: 100 CAPSULE ORAL at 15:13

## 2024-05-02 RX ADMIN — OXCARBAZEPINE 600 MG: 300 TABLET, FILM COATED ORAL at 08:20

## 2024-05-02 NOTE — PROGRESS NOTES
Pulmonary / Critical Care Progress Note      Patient Name: Rhett Aguirre  : 1980  MRN: [id number]  Attending:  Yasmine Hdz MD  Date of admission: 2024    Subjective   Subjective   Follow-up for necrotizing pneumonia    Over past 24 hours: Remained on Levaquin, Flagyl, and vancomycin.  Remains on Suboxone.  Pathology came back positive for organizing pneumonia.  No malignant cells    No acute events overnight    This morning,  Remains on room air  Lying in bed  Overall feeling better  Spiking fever up to 102.7 °F this morning.  Denies chest pain or chest tightness  Denies cough or hemoptysis  Diuresing well        Objective   Objective     Vitals:   Temp:  [97.3 °F (36.3 °C)-99.8 °F (37.7 °C)] 98.4 °F (36.9 °C)  Heart Rate:  [] 105  Resp:  [18-20] 18  BP: (116-128)/(58-64) 122/64    Physical Exam   Vital Signs Reviewed   General:  WDWN, Alert, in no acute distress.  Pleasant male, lying in bed  HEENT:  PERRL, EOMI.  OP, nares clear  Chest:  good aeration with improved rhonchi, no work of breathing noted on room air  CV: RRR, no MGR, pulses 2+, equal.  Abd:  Soft, NT, ND, + BS, no HSM  EXT:  no clubbing, no cyanosis, no edema  Neuro:  A&Ox3, CN grossly intact, no focal deficits.  Skin: No rashes or lesions noted      Result Review    Result Review:  I have personally reviewed the results from the time of this admission to 2024 07:16 EDT and agree with these findings:  [x]  Laboratory  [x]  Microbiology  [x]  Radiology  []  EKG/Telemetry   []  Cardiology/Vascular   []  Pathology  []  Old records  []  Other:  Most notable findings include:         Lab 24  0424 24  0821 24  0534 24  0600 24  0453 24  1701   WBC  --   --  17.35* 18.39*  --  22.21*   HEMOGLOBIN  --   --  9.9* 10.0*  --  10.0*   HEMATOCRIT  --   --  30.0* 29.3*  --  29.3*   PLATELETS  --   --  656* 621*  --  598*   SODIUM 137 133* 136 134* 140 135*   POTASSIUM 3.9 3.5 4.1 3.7 3.4*  3.5   CHLORIDE 102 97* 100 99 104 96*   CO2 25.6 22.2 23.4 24.0 25.0 27.6   BUN 9 8 6 4* 9 15   CREATININE 0.62* 0.69* 0.72* 0.68* 0.63* 0.92   GLUCOSE 90 123* 93 109* 89 109*   CALCIUM 8.4* 8.1* 8.4* 8.5* 7.9* 8.1*   TOTAL PROTEIN  --   --   --  6.4  --  6.6   ALBUMIN  --   --   --  2.6*  --  3.0*   GLOBULIN  --   --   --  3.8  --  3.6     -4/26 chest CT demonstrating a 10 cm cavitary lesion in the left lower lobe with mild mediastinal adenopathy consistent with necrotizing pneumonia    Assessment & Plan   Assessment / Plan     Active Hospital Problems:  Active Hospital Problems    Diagnosis     **Necrotizing pneumonia        Impression:  Left lower lobe necrotizing pneumonia from unspecified organism  Left lower lobe lung abscess from unspecified organism  Question aspiration and airways versus necrotizing pneumonia  Mediastinal adenopathy likely reactive  Leukocytosis  Hypocalcemia  Hypokalemia  Hyponatremia, clinically insignificant  Therapeutic drug monitoring of antibiotics  History substance abuse, heroin, marijuana, methamphetamines on Suboxone  History of tobacco abuse of cigarettes     Plan:  -Remain on room air.  Continue to maintain SpO2 greater than 90%   -Repeat blood culture.  Treatment in the setting of severe necrotizing lung and lung abscess with anaerobic coverage is Levaquin and Flagyl.  Will need a minimum of 6 weeks of treatment for lung abscess.  Day 5 of antibiotics   Bronchoscopy pathology came back positive for organizing pneumonia.  Will taper prednisone over 2 weeks.  -Repeat noncontrast chest CT in 6 to 8 weeks.  -Continue bronchopulmonary bronchodilator protocols.  Encourage I-S and flutter   -Encourage activity as tolerated   -Continue to trend renal panel and electrolytes.  Replace potassium orally   -Continue antipyretics   -Okay to discharge home from pulmonary standpoint  -Follow-up with pulmonology in 1 to 2 weeks after discharge         DVT prophylaxis:  Medical DVT prophylaxis  orders are present.    CODE STATUS:   Code Status (Patient has no pulse and is not breathing): CPR (Attempt to Resuscitate)  Medical Interventions (Patient has pulse or is breathing): Full Support    I have reviewed labs, imaging, pertinent clinical data and provider notes.   I have discussed with bedside nurse and primary service.     Electronically signed by Renard Parson MD, 05/02/24, 7:16 AM EDT.

## 2024-05-02 NOTE — PROGRESS NOTES
[facility]     Progress Note    Patient Name: Rhett Aguirre  : 1980  MRN: [id number]  Primary Care Physician:  Chris Potts MD  Date of admission: 2024    Subjective   No major acute events overnight  Patient feels better overall but sligtly tired today    Scheduled Meds:benzonatate, 200 mg, Oral, TID  buprenorphine-naloxone, 1 tablet, Sublingual, Daily  enoxaparin, 30 mg, Subcutaneous, Q24H  famotidine, 40 mg, Oral, Daily  levoFLOXacin, 750 mg, Intravenous, Q24H  metroNIDAZOLE, 500 mg, Intravenous, Q8H  OXcarbazepine, 600 mg, Oral, TID  predniSONE, 40 mg, Oral, Daily With Breakfast  pregabalin, 150 mg, Oral, TID  QUEtiapine, 300 mg, Oral, Nightly  vancomycin, 1,250 mg, Intravenous, Q8H      Continuous Infusions:lactated ringers, 30 mL/hr, Last Rate: 30 mL/hr (24 8344)  Pharmacy to Dose LevoFLOXacin (LEVAQUIN),   Pharmacy to dose vancomycin,       PRN Meds:.  acetaminophen **OR** acetaminophen **OR** acetaminophen    ALPRAZolam    aluminum-magnesium hydroxide-simethicone    senna-docusate sodium **AND** polyethylene glycol **AND** bisacodyl **AND** bisacodyl    guaiFENesin-dextromethorphan    ipratropium-albuterol    melatonin    nitroglycerin    ondansetron    Pharmacy to Dose LevoFLOXacin (LEVAQUIN)    Pharmacy to dose vancomycin    sodium chloride    [COMPLETED] Insert Peripheral IV **AND** sodium chloride       Review of Systems  Constitutional:        Weakness tiredness fatigue  Eyes:                       No blurry vision, eye discharge, eye irritation, eye pain  HEENT:                   No acute hair loss, earache and discharge, nasal congestion or discharge, sore throat, postnasal drip  Respiratory:           No shortness of breath coughing sputum production wheezing hemoptysis pleuritic chest pain  Cardiovascular:     No chest pain, orthopnea, PND, dizziness, palpitation, lower extremity edema  Gastrointestinal:   No nausea vomiting diarrhea abdominal pain  constipation  Genitourinary:       No urinary incontinence, hesitancy, frequency, urgency, dysuria  Hematologic:         No bruising, bleeding, pallor, lymphadenopathy  Endocrine:            No coldness, hot flashes, polyuria, abnormal hair growth  Musculoskeletal:    No body pains, aches, arthritic pains, muscle pain ,muscle wasting  Psychiatric:          No low or high mood, anxiety, hallucinations, delusions  Skin.                      No rash, ulcers, bruising, itching  Neurological:        No confusion, headache, focal weakness, numbness, dysphasia    Objective   Objective     Vitals:   Temp:  [97.3 °F (36.3 °C)-99.8 °F (37.7 °C)] 98.4 °F (36.9 °C)  Heart Rate:  [] 105  Resp:  [18-20] 18  BP: (116-128)/(58-64) 122/64  Physical Exam    Constitutional: Awake, alert responsive, conversant, no obvious distress              Psychiatric:  Appropriate affect, cooperative   Neurologic:  Awake alert ,oriented x 3, strength symmetric in all extremities, Cranial Nerves grossly intact to confrontation, speech clear   Eyes:   PERRLA, sclerae anicteric, no conjunctival injection   HEENT:  Moist mucous membranes, no nasal or eye discharge, no throat congestion   Neck:   Supple, no thyromegaly, no lymphadenopathy, trachea midline, no elevated JVD   Respiratory:  Clear to auscultation bilaterally, nonlabored respirations    Cardiovascular: RRR, no murmurs, rubs, or gallops, palpable pedal pulses bilaterally, No bilateral ankle edema   Gastrointestinal: Positive bowel sounds, soft, nontender, nondistended, no organomegaly   Musculoskeletal:  No clubbing or cyanosis to extremities,muscle wasting, joint swelling, muscle weakness             Skin:                      No rashes, bruising, skin ulcers, petechiae or ecchymosis    Result Review    Result Review:  I have personally reviewed the results from the time of this admission to 5/2/2024 07:36 EDT and agree with these findings:  []  Laboratory  []  Microbiology  []   Radiology  []  EKG/Telemetry   []  Cardiology/Vascular   []  Pathology  []  Old records  []  Other:    Assessment & Plan   Assessment / Plan       Active Hospital Problems:  Active Hospital Problems    Diagnosis     **Necrotizing pneumonia      43-year-old male chronically on Suboxone, bipolar disorder came in due to cough sore throat nausea and vomiting associated with fever in background of being treated with Z-Richie noted to have high-grade fever upon admission and x-ray showing left lower lobe consolidation and on CT scan showing dense consolidation with necrosis with mild pleural effusion. Patient status bronch with negative prlim results. Other pathogens testing negative.  Biopsy with concerns for organizing pneumonia     Plan:   Continue levequin and flagyl. Will DC Vanc on discharge  Will continue for 6 weeks  Prednisone taper recommended by pulm.  Appreciate their help  Jakob scheduled  Continue Suboxone at home dose  Continue Triptil, Lyrica and Seroquel at home doses  Will follow with pulm on discharge

## 2024-05-02 NOTE — NURSING NOTE
Pt aox4, on room air. Pt transferred to our unit. Second set of eyes performed with Laurence Panchal RN, no skin issues noted. Pt up adlib in room, no complaints of pain. Complaints of cough, medicated per the MAR prn.

## 2024-05-02 NOTE — PLAN OF CARE
Goal Outcome Evaluation:         Pt alert and oriented x4 with no changes in condition, patient has excessive cough, cough treated throughout the night (see MAR) vital signs stable, oral fluids encouraged, urine output throughout the night.

## 2024-05-03 ENCOUNTER — READMISSION MANAGEMENT (OUTPATIENT)
Dept: CALL CENTER | Facility: HOSPITAL | Age: 44
End: 2024-05-03
Payer: COMMERCIAL

## 2024-05-03 VITALS
WEIGHT: 196.21 LBS | HEART RATE: 90 BPM | HEIGHT: 71 IN | BODY MASS INDEX: 27.47 KG/M2 | DIASTOLIC BLOOD PRESSURE: 62 MMHG | OXYGEN SATURATION: 93 % | SYSTOLIC BLOOD PRESSURE: 109 MMHG | TEMPERATURE: 97.9 F | RESPIRATION RATE: 18 BRPM

## 2024-05-03 LAB
ANION GAP SERPL CALCULATED.3IONS-SCNC: 13 MMOL/L (ref 5–15)
BUN SERPL-MCNC: 11 MG/DL (ref 6–20)
BUN/CREAT SERPL: 16.4 (ref 7–25)
CALCIUM SPEC-SCNC: 8.3 MG/DL (ref 8.6–10.5)
CHLORIDE SERPL-SCNC: 100 MMOL/L (ref 98–107)
CO2 SERPL-SCNC: 21 MMOL/L (ref 22–29)
CREAT SERPL-MCNC: 0.67 MG/DL (ref 0.76–1.27)
EGFRCR SERPLBLD CKD-EPI 2021: 118.8 ML/MIN/1.73
GLUCOSE SERPL-MCNC: 119 MG/DL (ref 65–99)
H CAPSUL AG UR QL IA: NEGATIVE
POTASSIUM SERPL-SCNC: 3.7 MMOL/L (ref 3.5–5.2)
SODIUM SERPL-SCNC: 134 MMOL/L (ref 136–145)

## 2024-05-03 PROCEDURE — 63710000001 PREDNISONE PER 1 MG: Performed by: STUDENT IN AN ORGANIZED HEALTH CARE EDUCATION/TRAINING PROGRAM

## 2024-05-03 PROCEDURE — 25010000002 METRONIDAZOLE 500 MG/100ML SOLUTION: Performed by: INTERNAL MEDICINE

## 2024-05-03 PROCEDURE — 80048 BASIC METABOLIC PNL TOTAL CA: CPT | Performed by: STUDENT IN AN ORGANIZED HEALTH CARE EDUCATION/TRAINING PROGRAM

## 2024-05-03 PROCEDURE — 25010000002 VANCOMYCIN 5 G RECONSTITUTED SOLUTION: Performed by: INTERNAL MEDICINE

## 2024-05-03 PROCEDURE — 25810000003 SODIUM CHLORIDE 0.9 % SOLUTION: Performed by: INTERNAL MEDICINE

## 2024-05-03 PROCEDURE — 99232 SBSQ HOSP IP/OBS MODERATE 35: CPT | Performed by: INTERNAL MEDICINE

## 2024-05-03 RX ORDER — METRONIDAZOLE 500 MG/1
500 TABLET ORAL EVERY 8 HOURS SCHEDULED
Status: DISCONTINUED | OUTPATIENT
Start: 2024-05-03 | End: 2024-05-03 | Stop reason: HOSPADM

## 2024-05-03 RX ORDER — GUAIFENESIN/DEXTROMETHORPHAN 100-10MG/5
5 SYRUP ORAL EVERY 4 HOURS PRN
Qty: 473 ML | Refills: 0 | Status: SHIPPED | OUTPATIENT
Start: 2024-05-03

## 2024-05-03 RX ORDER — PREDNISONE 10 MG/1
10 TABLET ORAL DAILY
Qty: 3 TABLET | Refills: 0 | Status: SHIPPED | OUTPATIENT
Start: 2024-05-10 | End: 2024-05-10

## 2024-05-03 RX ORDER — PREDNISONE 20 MG/1
20 TABLET ORAL DAILY
Qty: 3 TABLET | Refills: 0 | Status: SHIPPED | OUTPATIENT
Start: 2024-05-07 | End: 2024-05-10

## 2024-05-03 RX ORDER — METRONIDAZOLE 500 MG/1
500 TABLET ORAL 3 TIMES DAILY
Qty: 102 TABLET | Refills: 0 | Status: SHIPPED | OUTPATIENT
Start: 2024-05-03 | End: 2024-06-06

## 2024-05-03 RX ORDER — LEVOFLOXACIN 750 MG/1
750 TABLET, FILM COATED ORAL DAILY
Qty: 34 TABLET | Refills: 0 | Status: SHIPPED | OUTPATIENT
Start: 2024-05-04 | End: 2024-06-07

## 2024-05-03 RX ORDER — PREDNISONE 20 MG/1
30 TABLET ORAL DAILY
Qty: 5 TABLET | Refills: 0 | Status: SHIPPED | OUTPATIENT
Start: 2024-05-03 | End: 2024-05-06

## 2024-05-03 RX ORDER — PREDNISONE 10 MG/1
5 TABLET ORAL DAILY
Qty: 2 TABLET | Refills: 0 | Status: SHIPPED | OUTPATIENT
Start: 2024-05-13 | End: 2024-05-10

## 2024-05-03 RX ORDER — ALPRAZOLAM 0.5 MG/1
0.5 TABLET ORAL 3 TIMES DAILY PRN
Qty: 9 TABLET | Refills: 0 | Status: SHIPPED | OUTPATIENT
Start: 2024-05-03 | End: 2024-05-06

## 2024-05-03 RX ADMIN — VANCOMYCIN HYDROCHLORIDE 1250 MG: 5 INJECTION, POWDER, LYOPHILIZED, FOR SOLUTION INTRAVENOUS at 06:40

## 2024-05-03 RX ADMIN — GUAIFENESIN AND DEXTROMETHORPHAN 5 ML: 100; 10 SYRUP ORAL at 08:45

## 2024-05-03 RX ADMIN — PREGABALIN 150 MG: 25 CAPSULE ORAL at 08:29

## 2024-05-03 RX ADMIN — FAMOTIDINE 40 MG: 20 TABLET ORAL at 08:29

## 2024-05-03 RX ADMIN — BUPRENORPHINE AND NALOXONE 1 TABLET: 8; 2 TABLET SUBLINGUAL at 08:29

## 2024-05-03 RX ADMIN — BENZONATATE 200 MG: 100 CAPSULE ORAL at 08:29

## 2024-05-03 RX ADMIN — METRONIDAZOLE 500 MG: 5 INJECTION, SOLUTION INTRAVENOUS at 08:32

## 2024-05-03 RX ADMIN — OXCARBAZEPINE 600 MG: 300 TABLET, FILM COATED ORAL at 08:29

## 2024-05-03 RX ADMIN — METRONIDAZOLE 500 MG: 5 INJECTION, SOLUTION INTRAVENOUS at 02:55

## 2024-05-03 RX ADMIN — PREDNISONE 40 MG: 20 TABLET ORAL at 08:29

## 2024-05-03 NOTE — PLAN OF CARE
Goal Outcome Evaluation:      Pt A/Ox4 on RA, up ad candido. PRN xanax administered. IV abx administered. No complaints overnight.

## 2024-05-03 NOTE — PROGRESS NOTES
Pulmonary / Critical Care Progress Note      Patient Name: Rhett Aguirre  : 1980  MRN: [id number]  Attending:  Yasmine Hdz MD  Date of admission: 2024    Subjective   Subjective   Follow-up for necrotizing pneumonia    Over past 24 hours: Remained on Levaquin, Flagyl.  Remains on Suboxone.  Pathology came back positive for organizing pneumonia.  No malignant cells.  Was planned for discharge, however had fever.  Was started on IV Zosyn.    No acute events overnight    This morning,  Remains on room air  Lying in bed  Overall feeling better  No fever anymore.  No chest pain or chest tightness.  Denies cough or hemoptysis  Diuresing well        Objective   Objective     Vitals:   Temp:  [96.9 °F (36.1 °C)-102.7 °F (39.3 °C)] 96.9 °F (36.1 °C)  Heart Rate:  [] 96  Resp:  [16-20] 18  BP: (120-136)/(64-98) 136/78    Physical Exam   Vital Signs Reviewed   General:  WDWN, Alert, in no acute distress.  Pleasant male, lying in bed  HEENT:  PERRL, EOMI.  OP, nares clear  Chest:  good aeration with improved rhonchi, no work of breathing noted on room air  CV: RRR, no MGR, pulses 2+, equal.  Abd:  Soft, NT, ND, + BS, no HSM  EXT:  no clubbing, no cyanosis, no edema  Neuro:  A&Ox3, CN grossly intact, no focal deficits.  Skin: No rashes or lesions noted      Result Review    Result Review:  I have personally reviewed the results from the time of this admission to 5/3/2024 07:11 EDT and agree with these findings:  [x]  Laboratory  [x]  Microbiology  [x]  Radiology  []  EKG/Telemetry   []  Cardiology/Vascular   []  Pathology  []  Old records  []  Other:  Most notable findings include:         Lab 24  0424 24  0821 24  0534 24  0600 24  0453 24  1701   WBC  --   --  17.35* 18.39*  --  22.21*   HEMOGLOBIN  --   --  9.9* 10.0*  --  10.0*   HEMATOCRIT  --   --  30.0* 29.3*  --  29.3*   PLATELETS  --   --  656* 621*  --  598*   SODIUM 137 133* 136 134* 140 135*    POTASSIUM 3.9 3.5 4.1 3.7 3.4* 3.5   CHLORIDE 102 97* 100 99 104 96*   CO2 25.6 22.2 23.4 24.0 25.0 27.6   BUN 9 8 6 4* 9 15   CREATININE 0.62* 0.69* 0.72* 0.68* 0.63* 0.92   GLUCOSE 90 123* 93 109* 89 109*   CALCIUM 8.4* 8.1* 8.4* 8.5* 7.9* 8.1*   TOTAL PROTEIN  --   --   --  6.4  --  6.6   ALBUMIN  --   --   --  2.6*  --  3.0*   GLOBULIN  --   --   --  3.8  --  3.6     -4/26 chest CT demonstrating a 10 cm cavitary lesion in the left lower lobe with mild mediastinal adenopathy consistent with necrotizing pneumonia    Assessment & Plan   Assessment / Plan     Active Hospital Problems:  Active Hospital Problems    Diagnosis     **Necrotizing pneumonia        Impression:  Left lower lobe necrotizing pneumonia from unspecified organism  Left lower lobe lung abscess from unspecified organism  Question aspiration and airways versus necrotizing pneumonia  Mediastinal adenopathy likely reactive  Leukocytosis  Hypocalcemia  Hypokalemia  Hyponatremia, clinically insignificant  Therapeutic drug monitoring of antibiotics  History substance abuse, heroin, marijuana, methamphetamines on Suboxone  History of tobacco abuse of cigarettes     Plan:  -Remain on room air.  Continue to maintain SpO2 greater than 90%   -Repeat blood culture no growth.  Likely fever post bronchoscopy.  Treatment in the setting of severe necrotizing lung and lung abscess with anaerobic coverage is Levaquin and Flagyl.  Will need a minimum of 6 weeks of treatment for lung abscess.  Day 6 of antibiotics   Bronchoscopy pathology came back positive for organizing pneumonia.  Will taper prednisone over 2 weeks.  -Repeat noncontrast chest CT in 6 to 8 weeks.  -Continue bronchopulmonary bronchodilator protocols.  Encourage I-S and flutter   -Encourage activity as tolerated   -Continue to trend renal panel and electrolytes.  Replace potassium orally   -Continue antipyretics   -Okay to discharge home from pulmonary standpoint  -Follow-up with pulmonology in 1 to  2 weeks after discharge  We will sign off now, call us with questions.       DVT prophylaxis:  Medical DVT prophylaxis orders are present.    CODE STATUS:   Code Status (Patient has no pulse and is not breathing): CPR (Attempt to Resuscitate)  Medical Interventions (Patient has pulse or is breathing): Full Support    I have reviewed labs, imaging, pertinent clinical data and provider notes.   I have discussed with bedside nurse and primary service.     Electronically signed by Renard Parson MD, 05/03/24, 7:11 AM EDT.

## 2024-05-03 NOTE — PLAN OF CARE
Goal Outcome Evaluation:  Plan of Care Reviewed With: patient        Progress: improving  Outcome Evaluation: pt aox4, on room air. still remains with cough, medicated per the MAR. Discharge education completed. No complaints of pain. IV removed. pt resting in bed awaiting family to get here for discharge

## 2024-05-03 NOTE — DISCHARGE INSTR - APPOINTMENTS
Follow up with Chris Potts MD  On May on 8 at 10:30 [facility], [facility] [address] [place] [address]

## 2024-05-03 NOTE — PROGRESS NOTES
"[facility] Clinical Pharmacy Services: Vancomycin Monitoring Note    Rhett Aguirre is a 43 y.o. male who is on day 8 of pharmacy to dose vancomycin for  necrotizing pneumonia .    Previous Vancomycin Dose:   1250 mg IV every  8  hours  Imaging Reviewed?: Yes    Updated Cultures and Sensitivities:    AFB cx,wash in process    No acid fast bacilli seen on direct smear P     No acid fast bacilli seen on concentrated smear      AFB stain,brushing ,lung,LLL- no AFB seen   AFB cx ,lavage ,LLL  in process   No acid fast bacilli seen on direct smear P     No acid fast bacilli seen on concentrated smear      resp cx- wash , no growth   gram stain ,brush LLL no organisms   BAL lavage ,LLL -no growth   PNA panel :negative   MRSA PCR: positive   S. Pneumo/legionella: negative   Resp cx: rare normal respiratory kd    Blood cx : NGTD    Blood- NGTD    Vitals/Labs  Ht: 180.3 cm (70.98\"); Wt: 89 kg (196 lb 3.4 oz)   Temp (24hrs), Av.8 °F (36.6 °C), Min:96.9 °F (36.1 °C), Max:98.4 °F (36.9 °C)   Estimated Creatinine Clearance: 193.4 mL/min (A) (by C-G formula based on SCr of 0.62 mg/dL (L)).     Results from last 7 days   Lab Units 24  0424 24  1117 24  0821 24  0534 24  0600 24  0453 24  1701   VANCOMYCIN RM mcg/mL  --   --   --   --  13.16  --   --    VANCOMYCIN TR mcg/mL  --  17.59  --   --   --   --   --    CREATININE mg/dL 0.62*  --  0.69* 0.72* 0.68*   < > 0.92   WBC 10*3/mm3  --   --   --  17.35* 18.39*  --  22.21*    < > = values in this interval not displayed.     Assessment/Plan  Current Vancomycin Dose:  1250 mg IV every 8 hours; which provides the following predicted parameters:    Per Dr Hdz, ok for vancomycin to auto stop and pt is discharging home today.     OK to continue on current dose.    Regimen: 1250 mg IV every 8 hours.  Start time: 14:40 on 2024  Exposure target: AUC24 (range)400-600 " mg/L.hr   AUC24,ss: 518 mg/L.hr  PAUC*: 98 %  Ctrough,ss: 15 mg/L  Pconc*: 4 %  Tox.: 10 %      Avi Medina  Clinical Pharmacist

## 2024-05-04 NOTE — OUTREACH NOTE
Prep Survey      Flowsheet Row Responses   [facility] facility patient discharged from? [facility]   Is LACE score < 7 ? No   Eligibility Readm Mgmt   Discharge diagnosis Necrotizing pneumonia   Does the patient have one of the following disease processes/diagnoses(primary or secondary)? Pneumonia   Does the patient have Home health ordered? No   Is there a DME ordered? No   Prep survey completed? Yes            Pascale AGUDELO - Registered Nurse

## 2024-05-07 ENCOUNTER — READMISSION MANAGEMENT (OUTPATIENT)
Dept: CALL CENTER | Facility: HOSPITAL | Age: 44
End: 2024-05-07
Payer: COMMERCIAL

## 2024-05-07 LAB
BACTERIA SPEC AEROBE CULT: NORMAL
BACTERIA SPEC AEROBE CULT: NORMAL

## 2024-05-10 ENCOUNTER — OFFICE VISIT (OUTPATIENT)
Dept: PULMONOLOGY | Facility: CLINIC | Age: 44
End: 2024-05-10
Payer: COMMERCIAL

## 2024-05-10 VITALS
HEART RATE: 83 BPM | BODY MASS INDEX: 26.96 KG/M2 | TEMPERATURE: 97.6 F | SYSTOLIC BLOOD PRESSURE: 121 MMHG | HEIGHT: 71 IN | OXYGEN SATURATION: 96 % | RESPIRATION RATE: 18 BRPM | DIASTOLIC BLOOD PRESSURE: 63 MMHG | WEIGHT: 192.6 LBS

## 2024-05-10 DIAGNOSIS — J85.1 ABSCESS OF LOWER LOBE OF LEFT LUNG WITH PNEUMONIA: ICD-10-CM

## 2024-05-10 DIAGNOSIS — F19.11 HISTORY OF SUBSTANCE ABUSE: ICD-10-CM

## 2024-05-10 DIAGNOSIS — F17.210 CIGARETTE NICOTINE DEPENDENCE WITHOUT COMPLICATION: ICD-10-CM

## 2024-05-10 DIAGNOSIS — J85.0 NECROTIZING PNEUMONIA: Primary | ICD-10-CM

## 2024-05-10 LAB
FUNGUS WND CULT: ABNORMAL
FUNGUS WND CULT: ABNORMAL

## 2024-05-10 RX ORDER — FUROSEMIDE 20 MG/1
1 TABLET ORAL DAILY
COMMUNITY

## 2024-05-10 RX ORDER — ALBUTEROL SULFATE 0.63 MG/3ML
3 SOLUTION RESPIRATORY (INHALATION) EVERY 6 HOURS PRN
COMMUNITY

## 2024-05-10 RX ORDER — MELOXICAM 15 MG/1
TABLET ORAL
COMMUNITY

## 2024-05-10 RX ORDER — HYDROXYZINE HYDROCHLORIDE 25 MG/1
TABLET, FILM COATED ORAL EVERY 12 HOURS SCHEDULED
COMMUNITY
Start: 2024-05-08

## 2024-05-10 RX ORDER — OSELTAMIVIR PHOSPHATE 75 MG/1
1 CAPSULE ORAL EVERY 12 HOURS SCHEDULED
COMMUNITY
Start: 2024-04-03 | End: 2024-05-10

## 2024-05-10 RX ORDER — ALBUTEROL SULFATE 2.5 MG/3ML
2.5 SOLUTION RESPIRATORY (INHALATION) 4 TIMES DAILY PRN
Qty: 360 ML | Refills: 2 | Status: SHIPPED | OUTPATIENT
Start: 2024-05-10

## 2024-05-10 RX ORDER — FENOFIBRATE 145 MG/1
1 TABLET, COATED ORAL DAILY
COMMUNITY

## 2024-05-10 RX ORDER — CLONIDINE HYDROCHLORIDE 0.3 MG/1
TABLET ORAL EVERY 8 HOURS SCHEDULED
COMMUNITY

## 2024-05-14 LAB
FUNGUS WND CULT: ABNORMAL
FUNGUS WND CULT: ABNORMAL

## 2024-05-16 ENCOUNTER — READMISSION MANAGEMENT (OUTPATIENT)
Dept: CALL CENTER | Facility: HOSPITAL | Age: 44
End: 2024-05-16
Payer: COMMERCIAL

## 2024-05-16 NOTE — OUTREACH NOTE
COPD/PN Week 2 Survey      Flowsheet Row Responses   [facility] patient discharged from? [facility]   Does the patient have one of the following disease processes/diagnoses(primary or secondary)? Pneumonia   Week 2 attempt successful? Yes   Call start time 1327   Call end time 1328   Discharge diagnosis Necrotizing pneumonia   Meds reviewed with patient/caregiver? Yes   Is the patient having any side effects they believe may be caused by any medication additions or changes? No   Does the patient have all medications ordered at discharge? Yes   Is the patient taking all medications as directed (includes completed medication regime)? Yes   Does the patient have a primary care provider?  Yes   Does the patient have an appointment with their PCP or specialist within 7 days of discharge? Yes   Has the patient kept scheduled appointments due by today? Yes   Has home health visited the patient within 72 hours of discharge? N/A   Psychosocial issues? No   Did the patient receive a copy of their discharge instructions? Yes   Nursing interventions Reviewed instructions with patient   What is the patient's perception of their health status since discharge? Improving   Nursing Interventions Nurse provided patient education   Are the patient's immunizations up to date?  Yes   If the patient is a current smoker, are they able to teach back resources for cessation? Not a smoker   Is the patient/caregiver able to teach back the hierarchy of who to call/visit for symptoms/problems? PCP, Specialist, Home health nurse, Urgent Care, ED, 911 Yes   Is the patient/caregiver able to teach back signs and symptoms of worsening condition: Fever/chills, Shortness of breath, Chest pain   Is the patient/caregiver able to teach back importance of completing antibiotic course of treatment? Yes   Week 2 call completed? Yes   Call end time 1328            Marta AGUDELO - Registered Nurse

## 2024-05-26 LAB
FUNGUS WND CULT: ABNORMAL
FUNGUS WND CULT: ABNORMAL

## 2024-05-29 LAB
FUNGUS WND CULT: ABNORMAL
FUNGUS WND CULT: ABNORMAL

## 2024-06-10 LAB
MYCOBACTERIUM SPEC CULT: NORMAL
MYCOBACTERIUM SPEC CULT: NORMAL
NIGHT BLUE STAIN TISS: NORMAL

## 2024-06-18 ENCOUNTER — HOSPITAL ENCOUNTER (OUTPATIENT)
Dept: CT IMAGING | Facility: HOSPITAL | Age: 44
Discharge: HOME | End: 2024-06-18
Admitting: NURSE PRACTITIONER
Payer: COMMERCIAL

## 2024-06-18 DIAGNOSIS — J85.0 NECROTIZING PNEUMONIA: ICD-10-CM

## 2024-06-18 PROCEDURE — 71250 CT THORAX DX C-: CPT

## 2025-04-16 ENCOUNTER — TELEPHONE (OUTPATIENT)
Dept: ORTHOPEDIC SURGERY | Facility: CLINIC | Age: 45
End: 2025-04-16
Payer: COMMERCIAL

## 2025-04-16 NOTE — TELEPHONE ENCOUNTER
Nondisplaced fracture along the radial aspect of the neck of the fourth metacarpal with soft tissue swelling.  XRAYS 4/16

## 2025-04-21 ENCOUNTER — OFFICE VISIT (OUTPATIENT)
Dept: ORTHOPEDIC SURGERY | Facility: CLINIC | Age: 45
End: 2025-04-21
Payer: COMMERCIAL

## 2025-04-21 VITALS
BODY MASS INDEX: 35 KG/M2 | HEART RATE: 83 BPM | SYSTOLIC BLOOD PRESSURE: 159 MMHG | DIASTOLIC BLOOD PRESSURE: 97 MMHG | WEIGHT: 250 LBS | OXYGEN SATURATION: 97 % | HEIGHT: 71 IN

## 2025-04-21 DIAGNOSIS — S62.354A CLOSED NONDISPLACED FRACTURE OF SHAFT OF FOURTH METACARPAL BONE OF RIGHT HAND, INITIAL ENCOUNTER: Primary | ICD-10-CM

## 2025-04-21 NOTE — PROGRESS NOTES
Chief Complaint  Initial Evaluation of the Right Hand     Subjective      Rhett Aguirre presents to [facility] for initial evaluation of the right hand.  He went to the  on 25.  Patient states that 25 he was changing a tire, the jodi gave out. This caused his hand to be temporarily trapped between the tire and the ground. He has some pain and swelling with light bruising in the hand. He had X rays and placed in a splint.      Allergies   Allergen Reactions    Penicillins Rash and Swelling    Sulfa Antibiotics Swelling and Rash    Sulfamethoxazole-Trimethoprim Swelling, Unknown - High Severity and Rash    Cefdinir Hives     Unknown reaction    Sulfadiazine Unknown - High Severity    Amoxicillin Unknown - High Severity, Swelling and Rash    Bee Venom Rash and Hives    Cephalexin Rash, Unknown - High Severity and Hives     Unknown reaction    Sulfacetamide-Sulfur In Urea Rash        Social History     Socioeconomic History    Marital status: Single   Tobacco Use    Smoking status: Former     Current packs/day: 0.00     Average packs/day: 1 pack/day for 30.3 years (30.3 ttl pk-yrs)     Types: Cigarettes     Start date:      Quit date: 2024     Years since quittin.9     Passive exposure: Past    Smokeless tobacco: Never   Vaping Use    Vaping status: Every Day    Substances: Nicotine, Flavoring    Devices: Disposable   Substance and Sexual Activity    Alcohol use: Not Currently    Drug use: Not Currently     Types: Methamphetamines, Marijuana, Heroin     Comment: h/o heroine opiod dependency    Sexual activity: Defer        I reviewed the patient's chief complaint, history of present illness, review of systems, past medical history, surgical history, family history, social history, medications, and allergy list.     Review of Systems     Constitutional: Denies fevers, chills, weight loss  Cardiovascular: Denies chest pain, shortness of breath  Skin: Denies  Last OV 3/29/17.   "rashes, acute skin changes  Neurologic: Denies headache, loss of consciousness        Vital Signs:   /97   Pulse 83   Ht 180.3 cm (71\")   Wt 113 kg (250 lb)   SpO2 97%   BMI 34.87 kg/m²          Physical Exam  General: Alert. No acute distress    Ortho Exam        RIGHT HAND He is in a splint. His short arm splint was removed.  Mild swelling and bruising.  Tender over the fracture site.   Sensation grossly intact to light touch, median, radial and ulnar nerve. Positive AIN, PIN and ulnar nerve motor function intact. Axillary nerve intact. Positive pulses.        Procedures        Imaging Results (Most Recent)       None             Result Review :         XR Hand 3+ View Right  Result Date: 4/16/2025  Narrative: XR HAND 3+ VW RIGHT Date of Exam: 4/16/2025 1:50 PM EDT Indication: Patient states while he was changing a tire, the jodi gave way and the tire came down on the patient's right hand.  This was 2 days ago. Comparison: Right index finger radiograph 9/26/2020 Findings: There is a nondisplaced fracture along the radial cortex of the distal fourth metacarpal neck. I do not see intra-articular extent. There is soft tissue swelling.     Impression: Impression: Nondisplaced fracture along the radial aspect of the neck of the fourth metacarpal with soft tissue swelling. Electronically Signed: Maddie Coronado MD  4/16/2025 2:07 PM EDT  Workstation ID: [id number]             Assessment and Plan     Diagnoses and all orders for this visit:    1. Closed nondisplaced fracture of the neck of fourth metacarpal bone of right hand, initial encounter (Primary)      Discussed the treatment plan with the patient. I reviewed the X-rays that were obtained 4/16/25 with the patient.    Short arm cast applied.  Cast care was educated on.  Elevate above heart to reduce swelling.     Return in 2-3 weeks for follow up fracture care/ management.       Will obtain X-Rays at next visit after the cast is removed.    Educated on risk of " smoking/nicotine. Discussed options for smoking cessation regarding chantix, nicorette gum and/ or to call the quit hotline at [phone]  and Call or return if worsening symptoms.    Follow Up     2-3 weeks with Xray after cast is removed.       Patient was given instructions and counseling regarding his condition or for health maintenance advice. Please see specific information pulled into the AVS if appropriate.     Scribed for Duy Douglas MD by Regina Machado MA.  04/21/25   13:20 EDT    I have personally performed the services described in this document as scribed by the above individual and it is both accurate and complete. Duy Douglas MD 04/22/25

## (undated) DEVICE — CONTAINER,SPEC,PNEUM TUBE,4OZ,STRL PATH: Brand: MEDLINE

## (undated) DEVICE — BLCK/BITE BLOX WO/DENTL/RIM W/STRAP 54F

## (undated) DEVICE — DISPOSABLE BIOPSY FORCEPS: Brand: DISPOSABLE BIOPSY FORCEPS

## (undated) DEVICE — DEV ATOMIZATION MUCOSAL/NASALTRACH

## (undated) DEVICE — LINER SURG CANSTR SXN S/RIGD 1500CC

## (undated) DEVICE — SINGLE USE SUCTION VALVE MAJ-209: Brand: SINGLE USE SUCTION VALVE (STERILE)

## (undated) DEVICE — SOLIDIFIER LIQLOC PLS 1500CC BT

## (undated) DEVICE — Device

## (undated) DEVICE — DISPOSABLE CYTOLOGY BRUSH: Brand: DISPOSABLE CYTOLOGY BRUSH

## (undated) DEVICE — SINGLE USE BIOPSY VALVE MAJ-210: Brand: SINGLE USE BIOPSY VALVE (STERILE)